# Patient Record
Sex: MALE | Race: BLACK OR AFRICAN AMERICAN | Employment: OTHER | URBAN - METROPOLITAN AREA
[De-identification: names, ages, dates, MRNs, and addresses within clinical notes are randomized per-mention and may not be internally consistent; named-entity substitution may affect disease eponyms.]

---

## 2020-10-14 ENCOUNTER — TRANSCRIBE ORDER (OUTPATIENT)
Dept: REGISTRATION | Age: 75
End: 2020-10-14

## 2020-10-14 ENCOUNTER — HOSPITAL ENCOUNTER (OUTPATIENT)
Dept: PREADMISSION TESTING | Age: 75
Discharge: HOME | End: 2020-10-14
Payer: MEDICARE

## 2020-10-14 DIAGNOSIS — N13.8 ENLARGED PROSTATE WITH URINARY OBSTRUCTION: Primary | ICD-10-CM

## 2020-10-14 DIAGNOSIS — N40.1 ENLARGED PROSTATE WITH URINARY OBSTRUCTION: Primary | ICD-10-CM

## 2020-10-14 DIAGNOSIS — N40.1 ENLARGED PROSTATE WITH URINARY OBSTRUCTION: ICD-10-CM

## 2020-10-14 DIAGNOSIS — N13.8 ENLARGED PROSTATE WITH URINARY OBSTRUCTION: ICD-10-CM

## 2020-10-14 LAB
ANION GAP SERPL CALC-SCNC: 5 MMOL/L (ref 3–18)
ATRIAL RATE: 78 BPM
BASOPHILS # BLD: 0 K/UL (ref 0–0.1)
BASOPHILS NFR BLD: 0 % (ref 0–2)
BUN SERPL-MCNC: 25 MG/DL (ref 7–18)
BUN/CREAT SERPL: 11 (ref 12–20)
CALCIUM SERPL-MCNC: 8.6 MG/DL (ref 8.5–10.1)
CALCULATED P AXIS, ECG09: 76 DEGREES
CALCULATED R AXIS, ECG10: 28 DEGREES
CALCULATED T AXIS, ECG11: 16 DEGREES
CHLORIDE SERPL-SCNC: 108 MMOL/L (ref 100–111)
CO2 SERPL-SCNC: 28 MMOL/L (ref 21–32)
CREAT SERPL-MCNC: 2.3 MG/DL (ref 0.6–1.3)
DIAGNOSIS, 93000: NORMAL
DIFFERENTIAL METHOD BLD: ABNORMAL
EOSINOPHIL # BLD: 0.2 K/UL (ref 0–0.4)
EOSINOPHIL NFR BLD: 3 % (ref 0–5)
ERYTHROCYTE [DISTWIDTH] IN BLOOD BY AUTOMATED COUNT: 13.5 % (ref 11.6–14.5)
GLUCOSE SERPL-MCNC: 106 MG/DL (ref 74–99)
HCT VFR BLD AUTO: 34.3 % (ref 36–48)
HGB BLD-MCNC: 10.5 G/DL (ref 13–16)
LYMPHOCYTES # BLD: 2 K/UL (ref 0.9–3.6)
LYMPHOCYTES NFR BLD: 33 % (ref 21–52)
MCH RBC QN AUTO: 28.3 PG (ref 24–34)
MCHC RBC AUTO-ENTMCNC: 30.6 G/DL (ref 31–37)
MCV RBC AUTO: 92.5 FL (ref 74–97)
MONOCYTES # BLD: 0.6 K/UL (ref 0.05–1.2)
MONOCYTES NFR BLD: 10 % (ref 3–10)
NEUTS SEG # BLD: 3.3 K/UL (ref 1.8–8)
NEUTS SEG NFR BLD: 54 % (ref 40–73)
P-R INTERVAL, ECG05: 182 MS
PLATELET # BLD AUTO: 224 K/UL (ref 135–420)
PMV BLD AUTO: 10.2 FL (ref 9.2–11.8)
POTASSIUM SERPL-SCNC: 3.9 MMOL/L (ref 3.5–5.5)
Q-T INTERVAL, ECG07: 408 MS
QRS DURATION, ECG06: 86 MS
QTC CALCULATION (BEZET), ECG08: 465 MS
RBC # BLD AUTO: 3.71 M/UL (ref 4.7–5.5)
SODIUM SERPL-SCNC: 141 MMOL/L (ref 136–145)
VENTRICULAR RATE, ECG03: 78 BPM
WBC # BLD AUTO: 6 K/UL (ref 4.6–13.2)

## 2020-10-14 PROCEDURE — 85025 COMPLETE CBC W/AUTO DIFF WBC: CPT

## 2020-10-14 PROCEDURE — 36415 COLL VENOUS BLD VENIPUNCTURE: CPT

## 2020-10-14 PROCEDURE — 80048 BASIC METABOLIC PNL TOTAL CA: CPT

## 2020-10-14 PROCEDURE — 87635 SARS-COV-2 COVID-19 AMP PRB: CPT

## 2020-10-14 PROCEDURE — 93005 ELECTROCARDIOGRAM TRACING: CPT

## 2020-10-15 LAB — SARS-COV-2, COV2NT: NOT DETECTED

## 2020-10-19 ENCOUNTER — ANESTHESIA EVENT (OUTPATIENT)
Dept: SURGERY | Age: 75
End: 2020-10-19
Payer: MEDICARE

## 2020-10-20 ENCOUNTER — APPOINTMENT (OUTPATIENT)
Dept: GENERAL RADIOLOGY | Age: 75
End: 2020-10-20
Attending: UROLOGY
Payer: MEDICARE

## 2020-10-20 ENCOUNTER — ANESTHESIA (OUTPATIENT)
Dept: SURGERY | Age: 75
End: 2020-10-20
Payer: MEDICARE

## 2020-10-20 ENCOUNTER — HOSPITAL ENCOUNTER (OUTPATIENT)
Age: 75
Setting detail: OUTPATIENT SURGERY
Discharge: HOME | End: 2020-10-20
Attending: UROLOGY | Admitting: UROLOGY
Payer: MEDICARE

## 2020-10-20 VITALS
SYSTOLIC BLOOD PRESSURE: 149 MMHG | BODY MASS INDEX: 24.89 KG/M2 | HEART RATE: 79 BPM | OXYGEN SATURATION: 100 % | DIASTOLIC BLOOD PRESSURE: 65 MMHG | HEIGHT: 67 IN | RESPIRATION RATE: 12 BRPM | WEIGHT: 158.6 LBS | TEMPERATURE: 98.5 F

## 2020-10-20 DIAGNOSIS — N99.116 POSTPROCEDURAL STRICTURE OF OVERLAPPING SITES OF URETHRA IN MALE: Primary | ICD-10-CM

## 2020-10-20 PROBLEM — N35.919 STRICTURE OF URETHRA: Status: ACTIVE | Noted: 2020-10-20

## 2020-10-20 PROCEDURE — 2709999900 HC NON-CHARGEABLE SUPPLY: Performed by: UROLOGY

## 2020-10-20 PROCEDURE — 77030005518 HC CATH URETH FOL 2W BARD -B: Performed by: UROLOGY

## 2020-10-20 PROCEDURE — 76210000027 HC REC RM PH II 3.5 TO 4 HR: Performed by: UROLOGY

## 2020-10-20 PROCEDURE — 77030012508 HC MSK AIRWY LMA AMBU -A: Performed by: ANESTHESIOLOGY

## 2020-10-20 PROCEDURE — 76060000032 HC ANESTHESIA 0.5 TO 1 HR: Performed by: UROLOGY

## 2020-10-20 PROCEDURE — 74011000250 HC RX REV CODE- 250: Performed by: NURSE ANESTHETIST, CERTIFIED REGISTERED

## 2020-10-20 PROCEDURE — 76010000138 HC OR TIME 0.5 TO 1 HR: Performed by: UROLOGY

## 2020-10-20 PROCEDURE — 77030014023 HC SYR INFL LVEEN BSC -B: Performed by: UROLOGY

## 2020-10-20 PROCEDURE — 74011250636 HC RX REV CODE- 250/636: Performed by: UROLOGY

## 2020-10-20 PROCEDURE — 74011250636 HC RX REV CODE- 250/636: Performed by: NURSE ANESTHETIST, CERTIFIED REGISTERED

## 2020-10-20 PROCEDURE — 74011000636 HC RX REV CODE- 636: Performed by: UROLOGY

## 2020-10-20 PROCEDURE — 76210000063 HC OR PH I REC FIRST 0.5 HR: Performed by: UROLOGY

## 2020-10-20 PROCEDURE — 77010033678 HC OXYGEN DAILY

## 2020-10-20 PROCEDURE — 77030040361 HC SLV COMPR DVT MDII -B: Performed by: UROLOGY

## 2020-10-20 PROCEDURE — C1726 CATH, BAL DIL, NON-VASCULAR: HCPCS | Performed by: UROLOGY

## 2020-10-20 PROCEDURE — 77030020782 HC GWN BAIR PAWS FLX 3M -B: Performed by: UROLOGY

## 2020-10-20 RX ORDER — SODIUM CHLORIDE 0.9 % (FLUSH) 0.9 %
5-40 SYRINGE (ML) INJECTION EVERY 8 HOURS
Status: DISCONTINUED | OUTPATIENT
Start: 2020-10-20 | End: 2020-10-20 | Stop reason: HOSPADM

## 2020-10-20 RX ORDER — TRAMADOL HYDROCHLORIDE 50 MG/1
50 TABLET ORAL
Qty: 10 TAB | Refills: 0 | Status: SHIPPED | OUTPATIENT
Start: 2020-10-20 | End: 2020-10-23

## 2020-10-20 RX ORDER — LEVOFLOXACIN 5 MG/ML
500 INJECTION, SOLUTION INTRAVENOUS ONCE
Status: COMPLETED | OUTPATIENT
Start: 2020-10-20 | End: 2020-10-20

## 2020-10-20 RX ORDER — FLUMAZENIL 0.1 MG/ML
0.2 INJECTION INTRAVENOUS
Status: DISCONTINUED | OUTPATIENT
Start: 2020-10-20 | End: 2020-10-20 | Stop reason: HOSPADM

## 2020-10-20 RX ORDER — MAGNESIUM SULFATE 100 %
4 CRYSTALS MISCELLANEOUS AS NEEDED
Status: DISCONTINUED | OUTPATIENT
Start: 2020-10-20 | End: 2020-10-20 | Stop reason: HOSPADM

## 2020-10-20 RX ORDER — SODIUM CHLORIDE 0.9 % (FLUSH) 0.9 %
5-40 SYRINGE (ML) INJECTION AS NEEDED
Status: DISCONTINUED | OUTPATIENT
Start: 2020-10-20 | End: 2020-10-20 | Stop reason: HOSPADM

## 2020-10-20 RX ORDER — HYDROMORPHONE HYDROCHLORIDE 1 MG/ML
0.5 INJECTION, SOLUTION INTRAMUSCULAR; INTRAVENOUS; SUBCUTANEOUS
Status: DISCONTINUED | OUTPATIENT
Start: 2020-10-20 | End: 2020-10-20 | Stop reason: HOSPADM

## 2020-10-20 RX ORDER — SODIUM CHLORIDE, SODIUM LACTATE, POTASSIUM CHLORIDE, CALCIUM CHLORIDE 600; 310; 30; 20 MG/100ML; MG/100ML; MG/100ML; MG/100ML
125 INJECTION, SOLUTION INTRAVENOUS CONTINUOUS
Status: DISCONTINUED | OUTPATIENT
Start: 2020-10-20 | End: 2020-10-20 | Stop reason: HOSPADM

## 2020-10-20 RX ORDER — LEVOFLOXACIN 250 MG/1
250 TABLET ORAL DAILY
Qty: 4 TAB | Refills: 0 | Status: SHIPPED | OUTPATIENT
Start: 2020-10-21 | End: 2020-10-25

## 2020-10-20 RX ORDER — ONDANSETRON 2 MG/ML
INJECTION INTRAMUSCULAR; INTRAVENOUS AS NEEDED
Status: DISCONTINUED | OUTPATIENT
Start: 2020-10-20 | End: 2020-10-20 | Stop reason: HOSPADM

## 2020-10-20 RX ORDER — FENTANYL CITRATE 50 UG/ML
25 INJECTION, SOLUTION INTRAMUSCULAR; INTRAVENOUS AS NEEDED
Status: DISCONTINUED | OUTPATIENT
Start: 2020-10-20 | End: 2020-10-20 | Stop reason: HOSPADM

## 2020-10-20 RX ORDER — LIDOCAINE HYDROCHLORIDE 20 MG/ML
INJECTION, SOLUTION EPIDURAL; INFILTRATION; INTRACAUDAL; PERINEURAL AS NEEDED
Status: DISCONTINUED | OUTPATIENT
Start: 2020-10-20 | End: 2020-10-20 | Stop reason: HOSPADM

## 2020-10-20 RX ORDER — NALOXONE HYDROCHLORIDE 0.4 MG/ML
0.1 INJECTION, SOLUTION INTRAMUSCULAR; INTRAVENOUS; SUBCUTANEOUS AS NEEDED
Status: DISCONTINUED | OUTPATIENT
Start: 2020-10-20 | End: 2020-10-20 | Stop reason: HOSPADM

## 2020-10-20 RX ORDER — PROPOFOL 10 MG/ML
INJECTION, EMULSION INTRAVENOUS AS NEEDED
Status: DISCONTINUED | OUTPATIENT
Start: 2020-10-20 | End: 2020-10-20 | Stop reason: HOSPADM

## 2020-10-20 RX ORDER — HYDROMORPHONE HYDROCHLORIDE 1 MG/ML
INJECTION, SOLUTION INTRAMUSCULAR; INTRAVENOUS; SUBCUTANEOUS AS NEEDED
Status: DISCONTINUED | OUTPATIENT
Start: 2020-10-20 | End: 2020-10-20 | Stop reason: HOSPADM

## 2020-10-20 RX ORDER — SODIUM CHLORIDE, SODIUM LACTATE, POTASSIUM CHLORIDE, CALCIUM CHLORIDE 600; 310; 30; 20 MG/100ML; MG/100ML; MG/100ML; MG/100ML
1000 INJECTION, SOLUTION INTRAVENOUS CONTINUOUS
Status: DISCONTINUED | OUTPATIENT
Start: 2020-10-20 | End: 2020-10-20 | Stop reason: HOSPADM

## 2020-10-20 RX ADMIN — PROPOFOL 100 MG: 10 INJECTION, EMULSION INTRAVENOUS at 08:01

## 2020-10-20 RX ADMIN — HYDROMORPHONE HYDROCHLORIDE 0.2 MG: 1 INJECTION, SOLUTION INTRAMUSCULAR; INTRAVENOUS; SUBCUTANEOUS at 07:52

## 2020-10-20 RX ADMIN — LIDOCAINE HYDROCHLORIDE 60 MG: 20 INJECTION, SOLUTION EPIDURAL; INFILTRATION; INTRACAUDAL; PERINEURAL at 08:00

## 2020-10-20 RX ADMIN — PROPOFOL 40 MG: 10 INJECTION, EMULSION INTRAVENOUS at 08:03

## 2020-10-20 RX ADMIN — SODIUM CHLORIDE, SODIUM LACTATE, POTASSIUM CHLORIDE, AND CALCIUM CHLORIDE 125 ML/HR: 600; 310; 30; 20 INJECTION, SOLUTION INTRAVENOUS at 07:31

## 2020-10-20 RX ADMIN — ONDANSETRON HYDROCHLORIDE 4 MG: 2 INJECTION INTRAMUSCULAR; INTRAVENOUS at 08:26

## 2020-10-20 RX ADMIN — HYDROMORPHONE HYDROCHLORIDE 0.1 MG: 1 INJECTION, SOLUTION INTRAMUSCULAR; INTRAVENOUS; SUBCUTANEOUS at 08:02

## 2020-10-20 RX ADMIN — SODIUM CHLORIDE, SODIUM LACTATE, POTASSIUM CHLORIDE, AND CALCIUM CHLORIDE 125 ML/HR: 600; 310; 30; 20 INJECTION, SOLUTION INTRAVENOUS at 08:38

## 2020-10-20 RX ADMIN — LEVOFLOXACIN 500 MG: 5 INJECTION, SOLUTION INTRAVENOUS at 07:52

## 2020-10-20 NOTE — H&P
Urology [facility] [address] [address]  Tel: [phone]  Fax: [phone]      Patient: Nancy Vargas  YOB: 1945          Assessment/Plan  # Detail Type Description    1. Assessment Acute cystitis without hematuria (N30.00). Patient Plan He had a recent UTI. He's feeling much better. He will come by and drop off a UA in the days ahead to send for culture. 2. 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 Patient Plan He has a hx of a urethral stricture and had a dilation done 5 yrs ago by Dr. Aspen Davila. We discussed likely he has a recurrence. We will do a cysto and possible urethral dilation in the OR. Risk of bleeding, infection, recurrence and need for cath for 2 to 7 days was discussed. He will proceed. 3. Assessment Feeling of incomplete bladder emptying (R39.14). Patient Plan His PVR is 572ml 2hrs post void. We discussed this is likely risk for getting a UTI. 4. Assessment Enlarged prostate w/ LUTS (N40.1). Patient Plan He also has some prostate enlargement. We discussed if his urethral caliber was normal then I will do a bipolar TURP instead of a dilation. Risk of bleeding, infection, injury as well as stricture formation and need for future tx's was discussed. We also discussed there may be sexual side effects as well. 5. Assessment Poor urinary stream (R39.12). Patient Plan Now that his infection was tx'd he's actually not that bad. This 66year old male presents for UTI and BPH. History of Present Illness:  1.  UTI   The onset was 2 weeks ago. The severity of the problem is moderate. The problem is improving. The symptoms are recurring. Presenting/Initial symptoms include lower back pain. Pertinent history includes being over 50 and alcohol consumption. Pertinent negatives include constipation.  Additional information: He has a h/o urethral stricture in 2015 treated by Dr Shanda Landis. HE nahd an enterococcus UTI. He ahs a h/o UTI several years ago. 2.  BPH   Onset was gradual. Severity level is moderate. Associated symptoms include dysuria. Pertinent negatives include chills, constipation, fever, hematuria, pressure, slow stream, urgency, urinary incontinence and urinary straining. Additional information: He ahs a h/o strictrue. Giovana Sim PAST MEDICAL/SURGICAL HISTORY   (Detailed)    Disease/disorder Onset Date Management Date Comments   Hypertension       Renal disease             PROBLEM LIST:   Problem List reviewed. Medications (active prior to today)  Medication Name Sig Description Start Date Stop Date Refilled Rx Elsewhere   amlodipine 5 mg tablet take 1 tablet by oral route  every day //   Y   tamsulosin 0.4 mg capsule take 1 capsule by oral route  every day 1/2 hour following the same meal each day //   Y   simvastatin 10 mg tablet take 1 tablet by oral route  every day in the evening //   Y   multivitamin capsule take 1 capsule by oral route  every day //   Y   Vitamin D2 1,250 mcg (50,000 unit) capsule take 1 capsule by ORAL route  every week //   Y   tizanidine 4 mg capsule take 1 capsule by ORAL route  every 4 hours //   Y   colchicine 0.6 mg capsule take 1 capsule by oral route  every day //   Y   amoxicillin 250 mg capsule take 1 capsule by oral route  every 8 hours //   Y     Medication Reconciliation  Medications reconciled today. Allergies  Ingredient Reaction (Severity) Medication Name Comment   NO KNOWN ALLERGIES        Reviewed, no changes. Family History  (Detailed)  Relationship Family Member Name  Age at Death Condition Onset Age Cause of Death   Family h/o    Diabetes     Family h/o    Heart disease           Social History:  (Detailed)  Tobacco use reviewed. Preferred language is [place]. MARITAL STATUS/FAMILY/SOCIAL SUPPORT  Marital status: Single   Smoking status: Former smoker.     TOBACCO SCREENING:  Patient has used tobacco. Patient has not used tobacco in the last 30 days. Patient has not used smokeless tobacco in the last 30 days. SMOKING STATUS  Type Smoking Status Usage Per Day Years Used Pack Years Total Pack Years    Former smoker         ALCOHOL  There is a history of alcohol use. consumed rarely. CAFFEINE  The patient uses caffeine: coffee. Review of Systems  System Neg/Pos Details   Constitutional Negative Chills and Fever. ENMT Negative Ear infections and Sore throat. Eyes Negative Blurred vision, Double vision and Eye pain. Respiratory Negative Asthma, Chronic cough, Dyspnea and Wheezing. Cardio Negative Chest pain. GI Negative Constipation, Decreased appetite, Diarrhea, Nausea and Vomiting.  Positive Dysuria.  Negative Hematuria, Pressure, Slow stream, Urgency, Urinary incontinence and Urinary straining. Endocrine Negative Cold intolerance, Heat intolerance, Increased thirst and Weight loss. Neuro Negative Headache and Tremors. Psych Negative Anxiety and Depression. Integumentary Negative Itching skin and Rash. MS Negative Back pain and Joint pain. Hema/Lymph Negative Easy bleeding. Reproductive Negative Sexual dysfunction. Vital Signs   Height  Time ft in cm Last Measured Height Position   3:50 PM 5.0 7.00 170.18     Weight/BSA/BMI  Time lb oz kg Context BMI kg/m2 BSA m2   3:50 .00  70.760  24.43    Blood Pressure  Time BP mm/Hg Position Side Site Method Cuff Size   3:50 /70        Temperature/Pulse/Respiration  Time Temp F Temp C Temp Site Pulse/min Pattern Resp/ min   3:50 PM 97.30 36.28  87     Measured By  Time Measured by   3:50 PM Kajal Tomlinson     Physical Exam  Exam Findings Details   Constitutional Normal Well developed. Neck Exam Normal Inspection - Normal.   Respiratory Normal Inspection - Normal.   Abdomen Normal No abdominal tenderness.    Genitourinary Normal Penis - Normal. Urethral meatus - Normal. Scrotum - Normal. Epididymides - Normal. Lymph nodes - Normal. Testes - Normal. No CVA tenderness. No suprapubic tenderness. Extremity Normal No edema. Psychiatric Normal Orientation - Oriented to time, place, person & situation. Appropriate mood and affect. Procedures:      Uroflow:  Feeling of incomplete bladder emptying R39.14. Consent was obtained. The procedure and risks were explained in detail. Questions were encouraged and answered. Patient was prepped and draped in the usual fashion. Procedure:   Sonographic residual urine: 572mL. Time after void: 2 Hours. Comments:. Physician: Andres Randall MD. Date: 10/08/2020. Time: 3:52 PM.  Post procedure: Instructions: [place]         Medications (added, continued, or stopped today)  Start Date Medication Directions PRN Status PRN Reason Instruction Stop Date    amlodipine 5 mg tablet take 1 tablet by oral route  every day N       amoxicillin 250 mg capsule take 1 capsule by oral route  every 8 hours N       colchicine 0.6 mg capsule take 1 capsule by oral route  every day N       multivitamin capsule take 1 capsule by oral route  every day N       simvastatin 10 mg tablet take 1 tablet by oral route  every day in the evening N       tamsulosin 0.4 mg capsule take 1 capsule by oral route  every day 1/2 hour following the same meal each day N       tizanidine 4 mg capsule take 1 capsule by ORAL route  every 4 hours N       Vitamin D2 1,250 mcg (50,000 unit) capsule take 1 capsule by ORAL route  every week N      Active Patient Care Team Members    Name Contact Agency Type Support Role Relationship Active Date Inactive Date [address] [place]   Patient provider PCP      Isaac Whitney   Emergency Contact Other          Provider:      Kelly Lagos MD

## 2020-10-20 NOTE — ANESTHESIA POSTPROCEDURE EVALUATION
Procedure(s):  CYSTOSCOPY, URETHRAL DILATION WITH C-ARM. general    Anesthesia Post Evaluation        Comments: Post-Anesthesia Evaluation & Assessment    Patient hemodynamically stable    No untreated/active PONV    Post-operative hydration adequate. Adequate post-operative analgesia per PACU discharge criteria    Mental status & level of consciousness: alert and oriented x 3    Respiratory status: patent unassisted airway     No apparent anesthetic complications requiring additional post-anesthetic care    Patient has met all discharge requirements. Lul Humphries MD          INITIAL Post-op Vital signs:   Vitals Value Taken Time   /70 10/20/2020  8:50 AM   Temp 36.9 °C (98.5 °F) 10/20/2020  8:36 AM   Pulse 79 10/20/2020  8:53 AM   Resp 13 10/20/2020  8:53 AM   SpO2 100 % 10/20/2020  8:53 AM   Vitals shown include unvalidated device data.

## 2020-10-20 NOTE — PERIOP NOTES
Dr. Megan Bejarano notified of large clot at base of penis - order to irrigate - collins irrigated with 40ml , 40ml, 40 ml  of sterile  water - return of irrigant  - no clots - DR. Espinoza notified in OR 9 - will continue to monitor until Dr. Megan Bejarano able to come see pt

## 2020-10-20 NOTE — PERIOP NOTES
Reviewed PTA medication list with patient/caregiver and patient/caregiver denies any additional medications. Patient admits to having a responsible adult care for them at home for at least 24 hours after surgery. Patient encouraged to use gown warming system and informed that using said warming gown to regulate body temperature prior to a procedure has been shown to help reduce the risks of blood clots and infection. Patient's pharmacy of choice verified and documented in PTA medication section. Dual skin assessment & fall risk band verification completed with Shiv Holland RN.

## 2020-10-20 NOTE — ANESTHESIA PREPROCEDURE EVALUATION
Relevant Problems   No relevant active problems       Anesthetic History               Review of Systems / Medical History  Patient summary reviewed, nursing notes reviewed and pertinent labs reviewed    Pulmonary  Within defined limits                 Neuro/Psych   Within defined limits           Cardiovascular    Hypertension          Hyperlipidemia         GI/Hepatic/Renal         Renal disease: CRI       Endo/Other        Arthritis and anemia     Other Findings            Physical Exam    Airway  Mallampati: II  TM Distance: 4 - 6 cm  Neck ROM: normal range of motion   Mouth opening: Normal     Cardiovascular    Rhythm: regular  Rate: normal         Dental      Comments: Has two upper teeth that are possibly loose   Pulmonary  Breath sounds clear to auscultation               Abdominal  GI exam deferred       Other Findings            Anesthetic Plan    ASA: 3  Anesthesia type: general          Induction: Intravenous  Anesthetic plan and risks discussed with: Patient      Discussed with patient that the teeth could fall out

## 2020-10-20 NOTE — BRIEF OP NOTE
Brief Postoperative Note    Patient: Bhavna Sheriff  YOB: 1945  MRN: [id number]    Date of Procedure: 10/20/2020     Pre-Op Diagnosis: URETHRAL BULBOUS STRICTURE, BPH WITH OBSTRUCTION    Post-Op Diagnosis: POST-PROCEDURAL STRICTURE OF OVERLAPPING SITES OF URETHRA    Procedure(s):  CYSTOSCOPY, URETHRAL DILATION WITH C-ARM    Surgeon(s):  Kei Mayes MD    Surgical Assistant: None    Anesthesia: General     Estimated Blood Loss (mL): Minimal    Complications: None    Specimens: * No specimens in log *     Implants: * No implants in log *    Drains: 22 FR [place] TIP MORTENSEN    Findings: VERY TIGHT LONG STRICTURE INVOLVING URETHRAL BULB AND THE PROXIMAL ASPECT OF PENILE URETHRA. DILATED TO 30 FR.   BLADDER AND PROSTATE WERE FINE    Electronically Signed by Deven Linda MD on 10/20/2020 at 8:44 AM

## 2020-10-20 NOTE — DISCHARGE INSTRUCTIONS
Drink plenty of fluids to keep the urine clean. Resume pre-hospital diet. Ambulate in the house. Take prescriptions as directed. Keep collins leg bag emptied as instructed - do not allow to overfill. Dr. Charmaine Soto office will call with a follow up appointment. DISCHARGE SUMMARY from Nurse    PATIENT INSTRUCTIONS:    After general anesthesia or intravenous sedation, for 24 hours or while taking prescription Narcotics:  · Limit your activities  · Do not drive and operate hazardous machinery  · Do not make important personal or business decisions  · Do  not drink alcoholic beverages  · If you have not urinated within 8 hours after discharge, please contact your surgeon on call. Report the following to your surgeon:  · Excessive pain, swelling, redness or odor of or around the surgical area  · Temperature over 100.5  · Nausea and vomiting lasting longer than 4 hours or if unable to take medications  · Any signs of decreased circulation or nerve impairment to extremity: change in color, persistent  numbness, tingling, coldness or increase pain  · Any questions    What to do at Home:  Recommended activity: Ambulate in house. If you experience any of the following symptoms fever, chills, active bleeding, not producing urine in leg bag, persistent nausea and/or vomiting, please follow up with Dr. Gabe Garcia. *  Please give a list of your current medications to your Primary Care Provider. *  Please update this list whenever your medications are discontinued, doses are      changed, or new medications (including over-the-counter products) are added. *  Please carry medication information at all times in case of emergency situations. These are general instructions for a healthy lifestyle:    No smoking/ No tobacco products/ Avoid exposure to second hand smoke  Surgeon General's Warning:  Quitting smoking now greatly reduces serious risk to your health.     Obesity, smoking, and sedentary lifestyle greatly increases your risk for illness    A healthy diet, regular physical exercise & weight monitoring are important for maintaining a healthy lifestyle    You may be retaining fluid if you have a history of heart failure or if you experience any of the following symptoms:  Weight gain of 3 pounds or more overnight or 5 pounds in a week, increased swelling in our hands or feet or shortness of breath while lying flat in bed. Please call your doctor as soon as you notice any of these symptoms; do not wait until your next office visit. The discharge information has been reviewed with the patient and caregiver. The patient and caregiver verbalized understanding. Discharge medications reviewed with the patient and caregiver and appropriate educational materials and side effects teaching were provided. Learning About Coronavirus [phone])  Coronavirus [phone]): Overview  What is coronavirus (COVID-19)? The coronavirus disease (COVID-19) is caused by a virus. It is an illness that was first found in [place], [place], in December 2019. It has since spread worldwide. The virus can cause fever, cough, and trouble breathing. In severe cases, it can cause pneumonia and make it hard to breathe without help. It can cause death. Coronaviruses are a large group of viruses. They cause the common cold. They also cause more serious illnesses like Middle East respiratory syndrome (MERS) and severe acute respiratory syndrome (SARS). COVID-19 is caused by a novel coronavirus. That means it's a new type that has not been seen in people before. This virus spreads person-to-person through droplets from coughing and sneezing. It can also spread when you are close to someone who is infected. And it can spread when you touch something that has the virus on it, such as a doorknob or a tabletop. What can you do to protect yourself from coronavirus (COVID-19)?   The best way to protect yourself from getting sick is to:  · Avoid areas where there is an outbreak. · Avoid contact with people who may be infected. · Wash your hands often with soap or alcohol-based hand sanitizers. · Avoid crowds and try to stay at least 6 feet away from other people. · Wash your hands often, especially after you cough or sneeze. Use soap and water, and scrub for at least 20 seconds. If soap and water aren't available, use an alcohol-based hand . · Avoid touching your mouth, nose, and eyes. What can you do to avoid spreading the virus to others? To help avoid spreading the virus to others:  · Cover your mouth with a tissue when you cough or sneeze. Then throw the tissue in the trash. · Use a disinfectant to clean things that you touch often. · Stay home if you are sick or have been exposed to the virus. Don't go to school, work, or public areas. And don't use public transportation. · If you are sick:  ? Leave your home only if you need to get medical care. But call the doctor's office first so they know you're coming. And wear a face mask, if you have one.  ? If you have a face mask, wear it whenever you're around other people. It can help stop the spread of the virus when you cough or sneeze. ? Clean and disinfect your home every day. Use household  and disinfectant wipes or sprays. Take special care to clean things that you grab with your hands. These include doorknobs, remote controls, phones, and handles on your refrigerator and microwave. And don't forget countertops, tabletops, bathrooms, and computer keyboards. When to call for help  Call 911 anytime you think you may need emergency care. For example, call if:  · You have severe trouble breathing. (You can't talk at all.)  · You have constant chest pain or pressure. · You are severely dizzy or lightheaded. · You are confused or can't think clearly. · Your face and lips have a blue color. · You pass out (lose consciousness) or are very hard to wake up.   Call your doctor now if you develop symptoms such as:  · Shortness of breath. · Fever. · Cough. If you need to get care, call ahead to the doctor's office for instructions before you go. Make sure you wear a face mask, if you have one, to prevent exposing other people to the virus. Where can you get the latest information? The following health organizations are tracking and studying this virus. Their websites contain the most up-to-date information. Armin Katarzyna also learn what to do if you think you may have been exposed to the virus. · U.S. Centers for Disease Control and Prevention (CDC): The CDC provides updated news about the disease and travel advice. The website also tells you how to prevent the spread of infection. www.cdc.gov  · World Health Organization [facility]): WHO offers information about the virus outbreaks. WHO also has travel advice. www.who.int  Current as of: April 1, 2020               Content Version: 12.4  © 8099-2279 [employer]. Care instructions adapted under license by your healthcare professional. If you have questions about a medical condition or this instruction, always ask your healthcare professional. [address] any warranty or liability for your use of this information.     ___________________________________________________________________________________________________________________________________

## 2020-10-20 NOTE — PERIOP NOTES
Dr. Radha Cedillo at bedside - bleeding evaluated - gauze applied - ok for discharge - pt and sister re-instructed on collins care

## 2020-10-20 NOTE — PERIOP NOTES
Pt arrived to Phase 2 - alert and oriented - a large amount of urine noted on stretcher sheet during assessment ( catheter not completely closed from PACU ) 2 cm blood clot noted at base of penis - leg bag emptied - pt cleaned up with bath wipes - assisted to recliner

## 2020-10-20 NOTE — OP NOTES
[facility]  OPERATIVE REPORT    Name:  Miguel Craig  MR#:   [id number]  :  1945  ACCOUNT #:  [de-identified]  DATE OF SERVICE:  10/20/2020    PREOPERATIVE DIAGNOSES:  Urethral bulbar stricture, BPH with obstruction. POSTOPERATIVE DIAGNOSIS:  Postprocedural stricture of overlapping sites of urethra. PROCEDURE PERFORMED:  Cystoscopy, urethral dilation. SURGEON:  Velma Lau MD    ASSISTANT:  None. ANESTHESIA:  General.    COMPLICATIONS:  None. SPECIMENS REMOVED:  None. IMPLANTS:  None. DRAIN:  A 22-[place]-tip [place]. ESTIMATED BLOOD LOSS:  Minimal.    FINDINGS:  The patient had a very tight long stricture of the urethra involving the bulb and the proximal aspect of the penile urethra. It was dilated to 30-[place]ine[place]. The bladder and prostate were fine. INDICATIONS:  The patient is a gentleman with a history of TURP in the past.  He has also had a history of urethral stricture. He has had declining urinary symptoms and presents for evaluation. PROCEDURE:   Preoperatively, risks and benefits of the surgery were described to the patient. The risks include but were not limited to bleeding, infection, injury to the bladder, injury to the urethra, possible need for future procedures. The patient understood the risks and signed the informed consent. The patient was taken to the operating room and placed on the OR table in supine position. He was administered general anesthetic. He was administered intravenous antibiotics. He was placed in dorsal lithotomy position, prepped and draped in the usual sterile manner. A 22-[place]ine[place] cystoscope and sheath were then inserted transurethrally and atraumatically under direct vision using a 30-degree lens. When I passed the scope, the fossa navicularis was wide open and the distal aspect of the penile urethra was wide open. However, I got to the proximal aspect, the penile urethra became very tight stricture.   They were approximately 5-[place]. I passed a 0.035 sensor wire through this stricture and into the bladder as confirmed by fluoroscopy. The scope was removed. Over the wire, I passed a NephroMax balloon beyond the point of the stricture as confirmed by fluoroscopy. The stricture was inflated and allowed to remain inflated for 2 minutes. The balloon was then deflated and removed leaving the wire in place. I then passed my scope without difficulty. The full extent of the stricture was seen going from the urethral bulb all the way through the distal aspect of the penile urethra, approximately 3 cm in length. I then passed my scope through the prostatic urethra which was mostly patent. Once in the bladder, there was grade I bladder trabeculations. There were no stones, no tumors anywhere within the bladder. Bilateral ureteral orifices were in normal anatomic position. At this point, I removed my scope. Over the wire, I passed a 22-[place] Lyman-tip catheter. The wire was removed. The balloon was inflated. The patient was then taken out of lithotomy position, revived from anesthesia and taken to Recovery in stable condition.       MD MUNDO [email or web address]  D:  10/20/2020 8:59  T:  10/20/2020 11:17  JOB #:  [id number]

## 2020-10-20 NOTE — ADDENDUM NOTE
Addendum  created 10/20/20 2134 by Adan Callahan CRNA    Flowsheet accepted, Intraprocedure Flowsheets edited

## 2020-10-20 NOTE — PERIOP NOTES
TRANSFER - OUT REPORT:    Verbal report given to [facility], R(name) on Hoover Dakins  being transferred to phase 2(unit) for routine post - op       Report consisted of patients Situation, Background, Assessment and   Recommendations(SBAR). Information from the following report(s) SBAR, Kardex, OR Summary, Procedure Summary, Intake/Output and MAR was reviewed with the receiving nurse. Lines:   Peripheral IV 10/20/20 Left Hand (Active)   Site Assessment Clean, dry, & intact 10/20/20 0848   Phlebitis Assessment 0 10/20/20 0848   Infiltration Assessment 0 10/20/20 0848   Dressing Status Clean, dry, & intact 10/20/20 0848   Dressing Type Transparent;Tape 10/20/20 0848   Hub Color/Line Status Patent 10/20/20 0814   Action Taken Armboard 10/20/20 0814   Alcohol Cap Used No 10/20/20 0730        Opportunity for questions and clarification was provided.       Patient transported with:   Registered Nurse

## 2020-10-20 NOTE — PERIOP NOTES
Discharge instructions completed - opportunity for questions - instructed pt and family on collins care

## 2022-01-01 ENCOUNTER — HOME CARE VISIT (OUTPATIENT)
Dept: HOSPICE | Facility: HOSPICE | Age: 77
End: 2022-01-01
Payer: MEDICARE

## 2022-01-01 ENCOUNTER — APPOINTMENT (OUTPATIENT)
Dept: NON INVASIVE DIAGNOSTICS | Age: 77
DRG: 435 | End: 2022-01-01
Attending: FAMILY MEDICINE
Payer: MEDICARE

## 2022-01-01 ENCOUNTER — APPOINTMENT (OUTPATIENT)
Dept: CT IMAGING | Age: 77
DRG: 435 | End: 2022-01-01
Attending: FAMILY MEDICINE
Payer: MEDICARE

## 2022-01-01 ENCOUNTER — HOSPITAL ENCOUNTER (INPATIENT)
Age: 77
LOS: 4 days | Discharge: HOSPICE | DRG: 435 | End: 2022-05-28
Attending: EMERGENCY MEDICINE | Admitting: FAMILY MEDICINE
Payer: MEDICARE

## 2022-01-01 ENCOUNTER — APPOINTMENT (OUTPATIENT)
Dept: GENERAL RADIOLOGY | Age: 77
DRG: 435 | End: 2022-01-01
Attending: HOSPITALIST
Payer: MEDICARE

## 2022-01-01 ENCOUNTER — APPOINTMENT (OUTPATIENT)
Dept: GENERAL RADIOLOGY | Age: 77
DRG: 435 | End: 2022-01-01
Attending: PHYSICIAN ASSISTANT
Payer: MEDICARE

## 2022-01-01 ENCOUNTER — HOSPICE ADMISSION (OUTPATIENT)
Dept: HOSPICE | Facility: HOSPICE | Age: 77
End: 2022-01-01
Payer: MEDICARE

## 2022-01-01 ENCOUNTER — APPOINTMENT (OUTPATIENT)
Dept: GENERAL RADIOLOGY | Age: 77
End: 2022-01-01
Attending: EMERGENCY MEDICINE
Payer: MEDICARE

## 2022-01-01 ENCOUNTER — HOME CARE VISIT (OUTPATIENT)
Dept: SCHEDULING | Facility: HOME HEALTH | Age: 77
End: 2022-01-01
Payer: MEDICARE

## 2022-01-01 ENCOUNTER — HOSPITAL ENCOUNTER (EMERGENCY)
Age: 77
Discharge: HOME | End: 2022-05-19
Attending: EMERGENCY MEDICINE
Payer: MEDICARE

## 2022-01-01 ENCOUNTER — APPOINTMENT (OUTPATIENT)
Dept: CT IMAGING | Age: 77
DRG: 435 | End: 2022-01-01
Attending: HOSPITALIST
Payer: MEDICARE

## 2022-01-01 VITALS
DIASTOLIC BLOOD PRESSURE: 64 MMHG | OXYGEN SATURATION: 100 % | TEMPERATURE: 98.3 F | RESPIRATION RATE: 16 BRPM | BODY MASS INDEX: 17.74 KG/M2 | HEART RATE: 108 BPM | HEIGHT: 68 IN | WEIGHT: 117.06 LBS | SYSTOLIC BLOOD PRESSURE: 108 MMHG

## 2022-01-01 VITALS
OXYGEN SATURATION: 99 % | HEIGHT: 68 IN | DIASTOLIC BLOOD PRESSURE: 75 MMHG | SYSTOLIC BLOOD PRESSURE: 146 MMHG | HEART RATE: 91 BPM | TEMPERATURE: 97.7 F | BODY MASS INDEX: 17.28 KG/M2 | RESPIRATION RATE: 14 BRPM | WEIGHT: 114 LBS

## 2022-01-01 VITALS
TEMPERATURE: 97 F | DIASTOLIC BLOOD PRESSURE: 42 MMHG | HEART RATE: 92 BPM | SYSTOLIC BLOOD PRESSURE: 72 MMHG | RESPIRATION RATE: 28 BRPM

## 2022-01-01 VITALS
HEART RATE: 99 BPM | RESPIRATION RATE: 19 BRPM | DIASTOLIC BLOOD PRESSURE: 67 MMHG | SYSTOLIC BLOOD PRESSURE: 134 MMHG | TEMPERATURE: 97.4 F

## 2022-01-01 DIAGNOSIS — C78.7 CARCINOMA OF PANCREAS METASTATIC TO LIVER (HCC): ICD-10-CM

## 2022-01-01 DIAGNOSIS — C80.0 CARCINOMATOSIS (HCC): ICD-10-CM

## 2022-01-01 DIAGNOSIS — N18.32 STAGE 3B CHRONIC KIDNEY DISEASE (HCC): ICD-10-CM

## 2022-01-01 DIAGNOSIS — R55 RECURRENT SYNCOPE: Primary | ICD-10-CM

## 2022-01-01 DIAGNOSIS — D64.9 ANEMIA, UNSPECIFIED TYPE: Primary | ICD-10-CM

## 2022-01-01 DIAGNOSIS — N18.30 STAGE 3 CHRONIC KIDNEY DISEASE, UNSPECIFIED WHETHER STAGE 3A OR 3B CKD (HCC): ICD-10-CM

## 2022-01-01 DIAGNOSIS — C25.9 CARCINOMA OF PANCREAS METASTATIC TO LIVER (HCC): ICD-10-CM

## 2022-01-01 DIAGNOSIS — M54.50 CHRONIC BILATERAL LOW BACK PAIN WITHOUT SCIATICA: ICD-10-CM

## 2022-01-01 DIAGNOSIS — R55 SYNCOPE AND COLLAPSE: ICD-10-CM

## 2022-01-01 DIAGNOSIS — N30.00 ACUTE CYSTITIS WITHOUT HEMATURIA: ICD-10-CM

## 2022-01-01 DIAGNOSIS — G89.29 CHRONIC BILATERAL LOW BACK PAIN WITHOUT SCIATICA: ICD-10-CM

## 2022-01-01 DIAGNOSIS — R63.4 WEIGHT LOSS: ICD-10-CM

## 2022-01-01 LAB
ALBUMIN SERPL-MCNC: 2.8 G/DL (ref 3.4–5)
ALBUMIN/GLOB SERPL: 0.6 {RATIO} (ref 0.8–1.7)
ALP SERPL-CCNC: 570 U/L (ref 45–117)
ALT SERPL-CCNC: 87 U/L (ref 16–61)
ANION GAP SERPL CALC-SCNC: 10 MMOL/L (ref 3–18)
ANION GAP SERPL CALC-SCNC: 7 MMOL/L (ref 3–18)
ANION GAP SERPL CALC-SCNC: 8 MMOL/L (ref 3–18)
ANION GAP SERPL CALC-SCNC: 9 MMOL/L (ref 3–18)
APPEARANCE UR: CLEAR
AST SERPL-CCNC: 57 U/L (ref 10–38)
ATRIAL RATE: 105 BPM
BACTERIA URNS QL MICRO: ABNORMAL /HPF
BASOPHILS # BLD: 0 K/UL (ref 0–0.1)
BASOPHILS # BLD: 0 K/UL (ref 0–0.1)
BASOPHILS NFR BLD: 0 % (ref 0–2)
BASOPHILS NFR BLD: 0 % (ref 0–2)
BILIRUB SERPL-MCNC: 0.7 MG/DL (ref 0.2–1)
BILIRUB UR QL: NEGATIVE
BUN SERPL-MCNC: 47 MG/DL (ref 7–18)
BUN SERPL-MCNC: 47 MG/DL (ref 7–18)
BUN SERPL-MCNC: 50 MG/DL (ref 7–18)
BUN SERPL-MCNC: 55 MG/DL (ref 7–18)
BUN/CREAT SERPL: 20 (ref 12–20)
BUN/CREAT SERPL: 20 (ref 12–20)
BUN/CREAT SERPL: 21 (ref 12–20)
BUN/CREAT SERPL: 24 (ref 12–20)
CALCIUM SERPL-MCNC: 8.8 MG/DL (ref 8.5–10.1)
CALCIUM SERPL-MCNC: 9.1 MG/DL (ref 8.5–10.1)
CALCIUM SERPL-MCNC: 9.3 MG/DL (ref 8.5–10.1)
CALCIUM SERPL-MCNC: 9.6 MG/DL (ref 8.5–10.1)
CALCULATED P AXIS, ECG09: 80 DEGREES
CALCULATED R AXIS, ECG10: 9 DEGREES
CALCULATED T AXIS, ECG11: -23 DEGREES
CHLORIDE SERPL-SCNC: 106 MMOL/L (ref 100–111)
CHLORIDE SERPL-SCNC: 108 MMOL/L (ref 100–111)
CHLORIDE SERPL-SCNC: 109 MMOL/L (ref 100–111)
CHLORIDE SERPL-SCNC: 109 MMOL/L (ref 100–111)
CO2 SERPL-SCNC: 20 MMOL/L (ref 21–32)
CO2 SERPL-SCNC: 21 MMOL/L (ref 21–32)
CO2 SERPL-SCNC: 22 MMOL/L (ref 21–32)
CO2 SERPL-SCNC: 24 MMOL/L (ref 21–32)
COLOR UR: YELLOW
CREAT SERPL-MCNC: 2.27 MG/DL (ref 0.6–1.3)
CREAT SERPL-MCNC: 2.33 MG/DL (ref 0.6–1.3)
CREAT SERPL-MCNC: 2.39 MG/DL (ref 0.6–1.3)
CREAT SERPL-MCNC: 2.41 MG/DL (ref 0.6–1.3)
DIAGNOSIS, 93000: NORMAL
DIFFERENTIAL METHOD BLD: ABNORMAL
DIFFERENTIAL METHOD BLD: ABNORMAL
ECHO AO ROOT DIAM: 3.1 CM
ECHO AO ROOT INDEX: 1.88 CM/M2
ECHO AR MAX VEL PISA: 2.6 M/S
ECHO AV MEAN GRADIENT: 2 MMHG
ECHO AV MEAN VELOCITY: 0.7 M/S
ECHO AV PEAK GRADIENT: 4 MMHG
ECHO AV PEAK VELOCITY: 1 M/S
ECHO AV REGURGITANT PHT: 1218.5 MILLISECOND
ECHO AV VTI: 14.1 CM
ECHO EST RA PRESSURE: 3 MMHG
ECHO LA DIAMETER INDEX: 1.39 CM/M2
ECHO LA DIAMETER: 2.3 CM
ECHO LA TO AORTIC ROOT RATIO: 0.74
ECHO LV E' LATERAL VELOCITY: 7 CM/S
ECHO LV E' SEPTAL VELOCITY: 7 CM/S
ECHO LV EDV A2C: 53 ML
ECHO LV EDV A4C: 44 ML
ECHO LV EDV INDEX A4C: 27 ML/M2
ECHO LV EDV NDEX A2C: 32 ML/M2
ECHO LV EJECTION FRACTION A2C: 41 %
ECHO LV ESV A2C: 31 ML
ECHO LV ESV INDEX A2C: 19 ML/M2
ECHO LV FRACTIONAL SHORTENING: 34 % (ref 28–44)
ECHO LV INTERNAL DIMENSION DIASTOLE INDEX: 2.48 CM/M2
ECHO LV INTERNAL DIMENSION DIASTOLIC: 4.1 CM (ref 4.2–5.9)
ECHO LV INTERNAL DIMENSION SYSTOLIC INDEX: 1.64 CM/M2
ECHO LV INTERNAL DIMENSION SYSTOLIC: 2.7 CM
ECHO LV IVSD: 0.8 CM (ref 0.6–1)
ECHO LV MASS 2D: 114.1 G (ref 88–224)
ECHO LV MASS INDEX 2D: 69.2 G/M2 (ref 49–115)
ECHO LV POSTERIOR WALL DIASTOLIC: 1 CM (ref 0.6–1)
ECHO LV RELATIVE WALL THICKNESS RATIO: 0.49
ECHO LVOT AREA: 3.5 CM2
ECHO LVOT DIAM: 2.1 CM
ECHO MV A VELOCITY: 0.4 M/S
ECHO MV E VELOCITY: 0.76 M/S
ECHO MV E/A RATIO: 1.9
ECHO MV E/E' LATERAL: 10.86
ECHO MV E/E' RATIO (AVERAGED): 10.86
ECHO MV E/E' SEPTAL: 10.86
ECHO PULMONARY ARTERY SYSTOLIC PRESSURE (PASP): 36 MMHG
ECHO RIGHT VENTRICULAR SYSTOLIC PRESSURE (RVSP): 36 MMHG
ECHO RV INTERNAL DIMENSION: 1.7 CM
ECHO RV TAPSE: 1.2 CM (ref 1.7–?)
ECHO TV REGURGITANT MAX VELOCITY: 2.87 M/S
ECHO TV REGURGITANT PEAK GRADIENT: 33 MMHG
EOSINOPHIL # BLD: 0 K/UL (ref 0–0.4)
EOSINOPHIL # BLD: 0.1 K/UL (ref 0–0.4)
EOSINOPHIL NFR BLD: 0 % (ref 0–5)
EOSINOPHIL NFR BLD: 1 % (ref 0–5)
EPITH CASTS URNS QL MICRO: ABNORMAL /LPF (ref 0–5)
ERYTHROCYTE [DISTWIDTH] IN BLOOD BY AUTOMATED COUNT: 15.8 % (ref 11.6–14.5)
ERYTHROCYTE [DISTWIDTH] IN BLOOD BY AUTOMATED COUNT: 16 % (ref 11.6–14.5)
ETHANOL SERPL-MCNC: <3 MG/DL (ref 0–3)
FOLATE SERPL-MCNC: 10.2 NG/ML (ref 3.1–17.5)
GLOBULIN SER CALC-MCNC: 4.5 G/DL (ref 2–4)
GLUCOSE SERPL-MCNC: 112 MG/DL (ref 74–99)
GLUCOSE SERPL-MCNC: 115 MG/DL (ref 74–99)
GLUCOSE SERPL-MCNC: 140 MG/DL (ref 74–99)
GLUCOSE SERPL-MCNC: 98 MG/DL (ref 74–99)
GLUCOSE UR STRIP.AUTO-MCNC: NEGATIVE MG/DL
HCT VFR BLD AUTO: 32.7 % (ref 36–48)
HCT VFR BLD AUTO: 32.7 % (ref 36–48)
HGB BLD-MCNC: 10.2 G/DL (ref 13–16)
HGB BLD-MCNC: 10.3 G/DL (ref 13–16)
HGB UR QL STRIP: NEGATIVE
IMM GRANULOCYTES # BLD AUTO: 0 K/UL (ref 0–0.04)
IMM GRANULOCYTES # BLD AUTO: 0.1 K/UL (ref 0–0.04)
IMM GRANULOCYTES NFR BLD AUTO: 0 % (ref 0–0.5)
IMM GRANULOCYTES NFR BLD AUTO: 1 % (ref 0–0.5)
IRON SATN MFR SERPL: 25 % (ref 20–50)
IRON SERPL-MCNC: 35 UG/DL (ref 50–175)
KETONES UR QL STRIP.AUTO: NEGATIVE MG/DL
LEUKOCYTE ESTERASE UR QL STRIP.AUTO: ABNORMAL
LYMPHOCYTES # BLD: 1.7 K/UL (ref 0.9–3.6)
LYMPHOCYTES # BLD: 4 K/UL (ref 0.9–3.6)
LYMPHOCYTES NFR BLD: 20 % (ref 21–52)
LYMPHOCYTES NFR BLD: 33 % (ref 21–52)
MAGNESIUM SERPL-MCNC: 2.8 MG/DL (ref 1.6–2.6)
MCH RBC QN AUTO: 29.8 PG (ref 24–34)
MCH RBC QN AUTO: 29.8 PG (ref 24–34)
MCHC RBC AUTO-ENTMCNC: 31.2 G/DL (ref 31–37)
MCHC RBC AUTO-ENTMCNC: 31.5 G/DL (ref 31–37)
MCV RBC AUTO: 94.5 FL (ref 78–100)
MCV RBC AUTO: 95.6 FL (ref 78–100)
MONOCYTES # BLD: 0.6 K/UL (ref 0.05–1.2)
MONOCYTES # BLD: 0.8 K/UL (ref 0.05–1.2)
MONOCYTES NFR BLD: 6 % (ref 3–10)
MONOCYTES NFR BLD: 7 % (ref 3–10)
NEUTS SEG # BLD: 6 K/UL (ref 1.8–8)
NEUTS SEG # BLD: 7.3 K/UL (ref 1.8–8)
NEUTS SEG NFR BLD: 60 % (ref 40–73)
NEUTS SEG NFR BLD: 72 % (ref 40–73)
NITRITE UR QL STRIP.AUTO: NEGATIVE
NRBC # BLD: 0 K/UL (ref 0–0.01)
NRBC # BLD: 0 K/UL (ref 0–0.01)
NRBC BLD-RTO: 0 PER 100 WBC
NRBC BLD-RTO: 0 PER 100 WBC
P-R INTERVAL, ECG05: 162 MS
PH UR STRIP: 6 [PH] (ref 5–8)
PLATELET # BLD AUTO: 259 K/UL (ref 135–420)
PLATELET # BLD AUTO: 268 K/UL (ref 135–420)
PMV BLD AUTO: 10.9 FL (ref 9.2–11.8)
PMV BLD AUTO: 9.7 FL (ref 9.2–11.8)
POTASSIUM SERPL-SCNC: 4.3 MMOL/L (ref 3.5–5.5)
POTASSIUM SERPL-SCNC: 4.7 MMOL/L (ref 3.5–5.5)
POTASSIUM SERPL-SCNC: 5.1 MMOL/L (ref 3.5–5.5)
POTASSIUM SERPL-SCNC: 5.4 MMOL/L (ref 3.5–5.5)
PROT SERPL-MCNC: 7.3 G/DL (ref 6.4–8.2)
PROT UR STRIP-MCNC: 100 MG/DL
Q-T INTERVAL, ECG07: 360 MS
QRS DURATION, ECG06: 88 MS
QTC CALCULATION (BEZET), ECG08: 475 MS
RBC # BLD AUTO: 3.42 M/UL (ref 4.35–5.65)
RBC # BLD AUTO: 3.46 M/UL (ref 4.35–5.65)
RBC #/AREA URNS HPF: NEGATIVE /HPF (ref 0–5)
SODIUM SERPL-SCNC: 137 MMOL/L (ref 136–145)
SODIUM SERPL-SCNC: 138 MMOL/L (ref 136–145)
SODIUM SERPL-SCNC: 139 MMOL/L (ref 136–145)
SODIUM SERPL-SCNC: 139 MMOL/L (ref 136–145)
SP GR UR REFRACTOMETRY: 1.02 (ref 1–1.03)
TIBC SERPL-MCNC: 141 UG/DL (ref 250–450)
TROPONIN-HIGH SENSITIVITY: 22 NG/L (ref 0–78)
TROPONIN-HIGH SENSITIVITY: 22 NG/L (ref 0–78)
TROPONIN-HIGH SENSITIVITY: 28 NG/L (ref 0–78)
TSH SERPL DL<=0.05 MIU/L-ACNC: 1.89 UIU/ML (ref 0.36–3.74)
UROBILINOGEN UR QL STRIP.AUTO: 1 EU/DL (ref 0.2–1)
VENTRICULAR RATE, ECG03: 105 BPM
VIT B12 SERPL-MCNC: 1390 PG/ML (ref 211–911)
WBC # BLD AUTO: 12.1 K/UL (ref 4.6–13.2)
WBC # BLD AUTO: 8.4 K/UL (ref 4.6–13.2)
WBC URNS QL MICRO: ABNORMAL /HPF (ref 0–5)

## 2022-01-01 PROCEDURE — 74011250637 HC RX REV CODE- 250/637: Performed by: FAMILY MEDICINE

## 2022-01-01 PROCEDURE — 36415 COLL VENOUS BLD VENIPUNCTURE: CPT

## 2022-01-01 PROCEDURE — 97165 OT EVAL LOW COMPLEX 30 MIN: CPT

## 2022-01-01 PROCEDURE — 85025 COMPLETE CBC W/AUTO DIFF WBC: CPT

## 2022-01-01 PROCEDURE — 97530 THERAPEUTIC ACTIVITIES: CPT

## 2022-01-01 PROCEDURE — 99356 PR PROLONGED SVC I/P OR OBS SETTING 1ST HOUR: CPT | Performed by: NURSE PRACTITIONER

## 2022-01-01 PROCEDURE — 74011250636 HC RX REV CODE- 250/636: Performed by: HOSPITALIST

## 2022-01-01 PROCEDURE — 83735 ASSAY OF MAGNESIUM: CPT

## 2022-01-01 PROCEDURE — 74011250637 HC RX REV CODE- 250/637: Performed by: HOSPITALIST

## 2022-01-01 PROCEDURE — 74011250636 HC RX REV CODE- 250/636: Performed by: EMERGENCY MEDICINE

## 2022-01-01 PROCEDURE — 84443 ASSAY THYROID STIM HORMONE: CPT

## 2022-01-01 PROCEDURE — 80048 BASIC METABOLIC PNL TOTAL CA: CPT

## 2022-01-01 PROCEDURE — 74011000250 HC RX REV CODE- 250: Performed by: FAMILY MEDICINE

## 2022-01-01 PROCEDURE — 93306 TTE W/DOPPLER COMPLETE: CPT

## 2022-01-01 PROCEDURE — 74011000250 HC RX REV CODE- 250: Performed by: HOSPITALIST

## 2022-01-01 PROCEDURE — 84484 ASSAY OF TROPONIN QUANT: CPT

## 2022-01-01 PROCEDURE — 72110 X-RAY EXAM L-2 SPINE 4/>VWS: CPT

## 2022-01-01 PROCEDURE — 0651 HSPC ROUTINE HOME CARE

## 2022-01-01 PROCEDURE — 74011250636 HC RX REV CODE- 250/636: Performed by: FAMILY MEDICINE

## 2022-01-01 PROCEDURE — 65270000046 HC RM TELEMETRY

## 2022-01-01 PROCEDURE — 71045 X-RAY EXAM CHEST 1 VIEW: CPT

## 2022-01-01 PROCEDURE — 81001 URINALYSIS AUTO W/SCOPE: CPT

## 2022-01-01 PROCEDURE — 3331090004 HSPC SERVICE INTENSITY ADD-ON

## 2022-01-01 PROCEDURE — 83540 ASSAY OF IRON: CPT

## 2022-01-01 PROCEDURE — G0299 HHS/HOSPICE OF RN EA 15 MIN: HCPCS

## 2022-01-01 PROCEDURE — 99284 EMERGENCY DEPT VISIT MOD MDM: CPT

## 2022-01-01 PROCEDURE — 74018 RADEX ABDOMEN 1 VIEW: CPT

## 2022-01-01 PROCEDURE — 80053 COMPREHEN METABOLIC PANEL: CPT

## 2022-01-01 PROCEDURE — 96374 THER/PROPH/DIAG INJ IV PUSH: CPT

## 2022-01-01 PROCEDURE — 70450 CT HEAD/BRAIN W/O DYE: CPT

## 2022-01-01 PROCEDURE — 74011250636 HC RX REV CODE- 250/636: Performed by: PHYSICIAN ASSISTANT

## 2022-01-01 PROCEDURE — 74176 CT ABD & PELVIS W/O CONTRAST: CPT

## 2022-01-01 PROCEDURE — 97162 PT EVAL MOD COMPLEX 30 MIN: CPT

## 2022-01-01 PROCEDURE — 82077 ASSAY SPEC XCP UR&BREATH IA: CPT

## 2022-01-01 PROCEDURE — 99221 1ST HOSP IP/OBS SF/LOW 40: CPT | Performed by: NURSE PRACTITIONER

## 2022-01-01 PROCEDURE — 99233 SBSQ HOSP IP/OBS HIGH 50: CPT | Performed by: NURSE PRACTITIONER

## 2022-01-01 PROCEDURE — 99285 EMERGENCY DEPT VISIT HI MDM: CPT

## 2022-01-01 PROCEDURE — 82607 VITAMIN B-12: CPT

## 2022-01-01 PROCEDURE — HOSPICE MEDICATION HC HH HOSPICE MEDICATION

## 2022-01-01 PROCEDURE — 3336500001 HSPC ELECTION

## 2022-01-01 PROCEDURE — 93005 ELECTROCARDIOGRAM TRACING: CPT

## 2022-01-01 RX ORDER — ACETAMINOPHEN 325 MG/1
650 TABLET ORAL
Status: DISCONTINUED | OUTPATIENT
Start: 2022-01-01 | End: 2022-01-01 | Stop reason: HOSPADM

## 2022-01-01 RX ORDER — CEPHALEXIN 500 MG/1
500 CAPSULE ORAL 2 TIMES DAILY
Qty: 14 CAPSULE | Refills: 0 | Status: SHIPPED | OUTPATIENT
Start: 2022-01-01 | End: 2022-01-01

## 2022-01-01 RX ORDER — MORPHINE SULFATE 20 MG/5ML
2.5 SOLUTION ORAL
Qty: 100 ML | Refills: 0 | Status: SHIPPED | OUTPATIENT
Start: 2022-01-01 | End: 2022-01-01

## 2022-01-01 RX ORDER — MELATONIN
1000 DAILY
Status: DISCONTINUED | OUTPATIENT
Start: 2022-01-01 | End: 2022-01-01

## 2022-01-01 RX ORDER — BISACODYL 5 MG
10 TABLET, DELAYED RELEASE (ENTERIC COATED) ORAL DAILY PRN
Status: DISCONTINUED | OUTPATIENT
Start: 2022-01-01 | End: 2022-01-01 | Stop reason: HOSPADM

## 2022-01-01 RX ORDER — SODIUM CHLORIDE 0.9 % (FLUSH) 0.9 %
5-40 SYRINGE (ML) INJECTION AS NEEDED
Status: DISCONTINUED | OUTPATIENT
Start: 2022-01-01 | End: 2022-01-01 | Stop reason: HOSPADM

## 2022-01-01 RX ORDER — HEPARIN SODIUM 5000 [USP'U]/ML
5000 INJECTION, SOLUTION INTRAVENOUS; SUBCUTANEOUS EVERY 8 HOURS
Status: DISCONTINUED | OUTPATIENT
Start: 2022-01-01 | End: 2022-01-01 | Stop reason: HOSPADM

## 2022-01-01 RX ORDER — FAMOTIDINE 20 MG/1
20 TABLET, FILM COATED ORAL 2 TIMES DAILY
Status: DISCONTINUED | OUTPATIENT
Start: 2022-01-01 | End: 2022-01-01

## 2022-01-01 RX ORDER — OXYCODONE HYDROCHLORIDE 5 MG/1
5 TABLET ORAL
Status: DISCONTINUED | OUTPATIENT
Start: 2022-01-01 | End: 2022-01-01 | Stop reason: HOSPADM

## 2022-01-01 RX ORDER — FAMOTIDINE 20 MG/1
20 TABLET, FILM COATED ORAL DAILY
Status: DISCONTINUED | OUTPATIENT
Start: 2022-01-01 | End: 2022-01-01

## 2022-01-01 RX ORDER — DEXTROSE MONOHYDRATE AND SODIUM CHLORIDE 5; .45 G/100ML; G/100ML
25 INJECTION, SOLUTION INTRAVENOUS CONTINUOUS
Status: DISCONTINUED | OUTPATIENT
Start: 2022-01-01 | End: 2022-01-01

## 2022-01-01 RX ORDER — LEVOFLOXACIN 500 MG/1
500 TABLET, FILM COATED ORAL DAILY
Qty: 5 TABLET | Refills: 0 | Status: SHIPPED | OUTPATIENT
Start: 2022-01-01 | End: 2022-01-01

## 2022-01-01 RX ORDER — HYOSCYAMINE SULFATE 0.125 MG
125 TABLET ORAL
Qty: 10 TABLET | Refills: 0 | Status: SHIPPED | OUTPATIENT
Start: 2022-01-01

## 2022-01-01 RX ORDER — MORPHINE SULFATE ORAL SOLUTION 10 MG/5ML
1 SOLUTION ORAL
Qty: 100 ML | Refills: 0 | Status: SHIPPED | OUTPATIENT
Start: 2022-01-01 | End: 2022-01-01

## 2022-01-01 RX ORDER — MORPHINE SULFATE 4 MG/ML
4 INJECTION INTRAVENOUS ONCE
Status: COMPLETED | OUTPATIENT
Start: 2022-01-01 | End: 2022-01-01

## 2022-01-01 RX ORDER — NALOXONE HYDROCHLORIDE 0.4 MG/ML
0.4 INJECTION, SOLUTION INTRAMUSCULAR; INTRAVENOUS; SUBCUTANEOUS AS NEEDED
Status: DISCONTINUED | OUTPATIENT
Start: 2022-01-01 | End: 2022-01-01 | Stop reason: HOSPADM

## 2022-01-01 RX ORDER — METHOCARBAMOL 750 MG/1
750 TABLET, FILM COATED ORAL 4 TIMES DAILY
Qty: 24 TABLET | Refills: 0 | Status: SHIPPED | OUTPATIENT
Start: 2022-01-01 | End: 2022-01-01

## 2022-01-01 RX ORDER — ONDANSETRON 2 MG/ML
4 INJECTION INTRAMUSCULAR; INTRAVENOUS
Status: DISCONTINUED | OUTPATIENT
Start: 2022-01-01 | End: 2022-01-01 | Stop reason: HOSPADM

## 2022-01-01 RX ORDER — FACIAL-BODY WIPES
10 EACH TOPICAL
Qty: 5 SUPPOSITORY | Refills: 0 | Status: SHIPPED | OUTPATIENT
Start: 2022-01-01

## 2022-01-01 RX ORDER — OMEGA-3-ACID ETHYL ESTERS 1 G/1
2 CAPSULE, LIQUID FILLED ORAL
Status: DISCONTINUED | OUTPATIENT
Start: 2022-01-01 | End: 2022-01-01

## 2022-01-01 RX ORDER — TAMSULOSIN HYDROCHLORIDE 0.4 MG/1
0.4 CAPSULE ORAL
Status: DISCONTINUED | OUTPATIENT
Start: 2022-01-01 | End: 2022-01-01 | Stop reason: HOSPADM

## 2022-01-01 RX ORDER — LORAZEPAM 2 MG/ML
1 CONCENTRATE ORAL
Qty: 30 ML | Refills: 0 | Status: SHIPPED | OUTPATIENT
Start: 2022-01-01

## 2022-01-01 RX ORDER — LORAZEPAM 2 MG/ML
1 CONCENTRATE ORAL
Qty: 30 ML | Refills: 0 | Status: SHIPPED | OUTPATIENT
Start: 2022-01-01 | End: 2022-01-01

## 2022-01-01 RX ORDER — SODIUM CHLORIDE 0.9 % (FLUSH) 0.9 %
5-40 SYRINGE (ML) INJECTION EVERY 8 HOURS
Status: DISCONTINUED | OUTPATIENT
Start: 2022-01-01 | End: 2022-01-01 | Stop reason: HOSPADM

## 2022-01-01 RX ORDER — ACETAMINOPHEN 650 MG/1
650 SUPPOSITORY RECTAL
Qty: 4 SUPPOSITORY | Refills: 0 | Status: SHIPPED | OUTPATIENT
Start: 2022-01-01

## 2022-01-01 RX ADMIN — SODIUM CHLORIDE, PRESERVATIVE FREE 20 MG: 5 INJECTION INTRAVENOUS at 22:51

## 2022-01-01 RX ADMIN — OMEGA-3-ACID ETHYL ESTERS 2000 MG: 1 CAPSULE, LIQUID FILLED ORAL at 10:23

## 2022-01-01 RX ADMIN — TAMSULOSIN HYDROCHLORIDE 0.4 MG: 0.4 CAPSULE ORAL at 18:04

## 2022-01-01 RX ADMIN — SODIUM CHLORIDE, PRESERVATIVE FREE 10 ML: 5 INJECTION INTRAVENOUS at 15:08

## 2022-01-01 RX ADMIN — HEPARIN SODIUM 5000 UNITS: 5000 INJECTION INTRAVENOUS; SUBCUTANEOUS at 16:36

## 2022-01-01 RX ADMIN — HEPARIN SODIUM 5000 UNITS: 5000 INJECTION INTRAVENOUS; SUBCUTANEOUS at 23:58

## 2022-01-01 RX ADMIN — OXYCODONE 5 MG: 5 TABLET ORAL at 09:11

## 2022-01-01 RX ADMIN — OXYCODONE 5 MG: 5 TABLET ORAL at 04:54

## 2022-01-01 RX ADMIN — OXYCODONE 5 MG: 5 TABLET ORAL at 16:52

## 2022-01-01 RX ADMIN — HEPARIN SODIUM 5000 UNITS: 5000 INJECTION INTRAVENOUS; SUBCUTANEOUS at 16:52

## 2022-01-01 RX ADMIN — TAMSULOSIN HYDROCHLORIDE 0.4 MG: 0.4 CAPSULE ORAL at 16:52

## 2022-01-01 RX ADMIN — OXYCODONE 5 MG: 5 TABLET ORAL at 15:08

## 2022-01-01 RX ADMIN — OXYCODONE 5 MG: 5 TABLET ORAL at 15:38

## 2022-01-01 RX ADMIN — SODIUM CHLORIDE, PRESERVATIVE FREE 10 ML: 5 INJECTION INTRAVENOUS at 21:15

## 2022-01-01 RX ADMIN — Medication 1000 UNITS: at 10:23

## 2022-01-01 RX ADMIN — SODIUM CHLORIDE, PRESERVATIVE FREE 10 ML: 5 INJECTION INTRAVENOUS at 06:51

## 2022-01-01 RX ADMIN — SODIUM CHLORIDE, PRESERVATIVE FREE 10 ML: 5 INJECTION INTRAVENOUS at 22:51

## 2022-01-01 RX ADMIN — HEPARIN SODIUM 5000 UNITS: 5000 INJECTION INTRAVENOUS; SUBCUTANEOUS at 00:36

## 2022-01-01 RX ADMIN — HEPARIN SODIUM 5000 UNITS: 5000 INJECTION INTRAVENOUS; SUBCUTANEOUS at 08:02

## 2022-01-01 RX ADMIN — SODIUM CHLORIDE, PRESERVATIVE FREE 20 MG: 5 INJECTION INTRAVENOUS at 08:45

## 2022-01-01 RX ADMIN — OXYCODONE 5 MG: 5 TABLET ORAL at 09:36

## 2022-01-01 RX ADMIN — OXYCODONE 5 MG: 5 TABLET ORAL at 23:33

## 2022-01-01 RX ADMIN — MORPHINE SULFATE 4 MG: 4 INJECTION INTRAVENOUS at 14:23

## 2022-01-01 RX ADMIN — SODIUM CHLORIDE, PRESERVATIVE FREE 10 ML: 5 INJECTION INTRAVENOUS at 06:06

## 2022-01-01 RX ADMIN — SODIUM CHLORIDE, PRESERVATIVE FREE 10 ML: 5 INJECTION INTRAVENOUS at 16:45

## 2022-01-01 RX ADMIN — HEPARIN SODIUM 5000 UNITS: 5000 INJECTION INTRAVENOUS; SUBCUTANEOUS at 01:00

## 2022-01-01 RX ADMIN — HEPARIN SODIUM 5000 UNITS: 5000 INJECTION INTRAVENOUS; SUBCUTANEOUS at 08:45

## 2022-01-01 RX ADMIN — SODIUM CHLORIDE 250 ML: 9 INJECTION, SOLUTION INTRAVENOUS at 11:07

## 2022-01-01 RX ADMIN — SODIUM CHLORIDE, PRESERVATIVE FREE 20 MG: 5 INJECTION INTRAVENOUS at 09:11

## 2022-01-01 RX ADMIN — TAMSULOSIN HYDROCHLORIDE 0.4 MG: 0.4 CAPSULE ORAL at 18:53

## 2022-01-01 RX ADMIN — HEPARIN SODIUM 5000 UNITS: 5000 INJECTION INTRAVENOUS; SUBCUTANEOUS at 16:45

## 2022-01-01 RX ADMIN — SODIUM CHLORIDE, PRESERVATIVE FREE 20 MG: 5 INJECTION INTRAVENOUS at 21:14

## 2022-01-01 RX ADMIN — SODIUM CHLORIDE, PRESERVATIVE FREE 10 ML: 5 INJECTION INTRAVENOUS at 22:35

## 2022-01-01 RX ADMIN — DEXTROSE MONOHYDRATE AND SODIUM CHLORIDE 50 ML/HR: 5; .45 INJECTION, SOLUTION INTRAVENOUS at 16:20

## 2022-01-01 RX ADMIN — FAMOTIDINE 20 MG: 20 TABLET, FILM COATED ORAL at 11:53

## 2022-01-01 RX ADMIN — OXYCODONE 5 MG: 5 TABLET ORAL at 05:35

## 2022-01-01 RX ADMIN — TAMSULOSIN HYDROCHLORIDE 0.4 MG: 0.4 CAPSULE ORAL at 16:36

## 2022-01-01 RX ADMIN — OXYCODONE 5 MG: 5 TABLET ORAL at 18:53

## 2022-01-01 RX ADMIN — SODIUM CHLORIDE, PRESERVATIVE FREE 10 ML: 5 INJECTION INTRAVENOUS at 21:31

## 2022-01-01 RX ADMIN — SODIUM CHLORIDE 1000 ML: 900 INJECTION, SOLUTION INTRAVENOUS at 13:31

## 2022-01-01 RX ADMIN — SODIUM CHLORIDE, PRESERVATIVE FREE 20 MG: 5 INJECTION INTRAVENOUS at 09:19

## 2022-01-01 RX ADMIN — SODIUM CHLORIDE, PRESERVATIVE FREE 10 ML: 5 INJECTION INTRAVENOUS at 14:00

## 2022-01-01 RX ADMIN — SODIUM CHLORIDE, PRESERVATIVE FREE 20 MG: 5 INJECTION INTRAVENOUS at 21:29

## 2022-05-19 NOTE — ED TRIAGE NOTES
Pt arrived via ems with c/o middle back pain that started last night but has gotten worse this AM. Per ems pt was ambulatory on scene. Pt verbalized he took pain meds this AM for his back but is unable to remember the medication name.

## 2022-05-19 NOTE — ED NOTES
I have reviewed discharge instructions with the patient, caregiver and guardian. The patient, caregiver and guardian verbalized understanding.

## 2022-05-19 NOTE — DISCHARGE INSTRUCTIONS
Return to the ED for worsening symptoms or further concerns. Select a geriatric specialist as your primary care doctor for further work-up. We recommend you start with your insurance carrier to see who they would recommend.

## 2022-05-19 NOTE — ED PROVIDER NOTES
EMERGENCY DEPARTMENT HISTORY AND PHYSICAL EXAM    Date: 5/19/2022  Patient Name: Jason Donis    History of Presenting Illness     Chief Complaint   Patient presents with    Back Pain       History Provided By: Patient, EMS and Family member     History Gutierrez Yeh):   1:03 PM  Jason Donis is a 64 y.o. male with a PMHX of hypertension, CKD 3 who presents to the emergency department by EMS C/O low back pain onset chronically but worse over the last day. Associated sxs include 10 pound weight loss over the last month. Pt denies  any other sxs or complaints. Patient's family member states that he has lost about 10 pounds over the last 2 weeks. She is giving him boost daily. He does seem to have decreased activity and decrease in his p.o. intake. Chief Complaint: Low back pain  Onset: Chronically but worse over the last day  Timing:  Acute on Chronic  Context: Symptoms started spontaneously, symptoms have progressively worsened since onset  Location: Bilateral low back  Quality: Sharp  Severity: Moderate  Modifying Factors: Nothing makes it better, or worse. Associated Symptoms: Weight loss, decreased activity, decreased oral intake    PCP: Leavy Severs, NP     Past History         Past Medical History:  Past Medical History:   Diagnosis Date    Chronic kidney disease     stage 3 in 2011    CKD (chronic kidney disease)     ED (erectile dysfunction)     Gout     HTN (hypertension)     Mixed hyperlipidemia        Past Surgical History:  Past Surgical History:   Procedure Laterality Date    HX COLONOSCOPY      HX ENDOSCOPY         Family History:  No family history on file.   Reviewed and non-contributory    Social History:  Social History     Tobacco Use    Smoking status: Former Smoker    Smokeless tobacco: Never Used   Substance Use Topics    Alcohol use: No     Alcohol/week: 0.0 standard drinks    Drug use: No       Medications:  Current Outpatient Medications   Medication Sig Dispense Refill    cephALEXin (Keflex) 500 mg capsule Take 1 Capsule by mouth two (2) times a day for 7 days. 14 Capsule 0    methocarbamoL (ROBAXIN) 750 mg tablet Take 1 Tablet by mouth four (4) times daily. 24 Tablet 0    amLODIPine (NORVASC) 5 mg tablet Take 5 mg by mouth daily.  tiZANidine (ZANAFLEX) 4 mg tablet Take 4 mg by mouth every six (6) hours as needed for Muscle Spasm(s).  MULTIVITAMINS WITH FLUORIDE (MULTI-VITAMIN PO) Take  by mouth.  Cholecalciferol, Vitamin D3, (VITAMIN D3) 1,000 unit cap Take  by mouth.  vardenafil (LEVITRA) 10 mg tablet Take 10 mg by mouth as needed.  omega-3 acid ethyl esters (LOVAZA) 1 gram capsule Take 2 g by mouth daily (with breakfast).  colchicine 0.6 mg tablet Take 0.6 mg by mouth daily as needed.  tamsulosin (FLOMAX) 0.4 mg capsule Take 0.4 mg by mouth Daily (before dinner).  simvastatin (ZOCOR) 10 mg tablet Take  by mouth nightly. Allergies:  No Known Allergies    Review of Systems      Review of Systems   Constitutional: Positive for activity change and appetite change. Negative for chills and fever. HENT: Negative for rhinorrhea and sore throat. Eyes: Negative for pain and visual disturbance. Respiratory: Negative for chest tightness, shortness of breath and wheezing. Cardiovascular: Negative for chest pain and palpitations. Gastrointestinal: Negative for abdominal pain, diarrhea, nausea and vomiting. Musculoskeletal: Positive for back pain. Negative for arthralgias and myalgias. Skin: Negative for rash and wound. Neurological: Negative for speech difficulty, light-headedness and headaches. Psychiatric/Behavioral: Negative for agitation and confusion. All other systems reviewed and are negative.       Physical Exam     Vitals:    05/19/22 1603 05/19/22 1613 05/19/22 1633 05/19/22 1712   BP: (!) 157/74 (!) 144/78 (!) 147/78 136/75   Pulse: 88 87 88 90   Resp: 16 18 21 22   Temp:       SpO2: 98% 99% 98% 99% Weight:       Height:           Physical Exam  Vitals and nursing note reviewed. Constitutional:       General: He is not in acute distress. Appearance: Normal appearance. He is underweight. He is not ill-appearing. HENT:      Head: Normocephalic and atraumatic. Nose: Nose normal. No rhinorrhea. Mouth/Throat:      Mouth: Mucous membranes are moist.      Pharynx: No oropharyngeal exudate or posterior oropharyngeal erythema. Eyes:      Extraocular Movements: Extraocular movements intact. Conjunctiva/sclera: Conjunctivae normal.      Pupils: Pupils are equal, round, and reactive to light. Cardiovascular:      Rate and Rhythm: Regular rhythm. Tachycardia present. Heart sounds: No murmur heard. No friction rub. No gallop. Pulmonary:      Effort: Pulmonary effort is normal. No respiratory distress. Breath sounds: Normal breath sounds. No wheezing, rhonchi or rales. Abdominal:      General: Bowel sounds are normal.      Palpations: Abdomen is soft. Tenderness: There is no abdominal tenderness. There is no guarding or rebound. Musculoskeletal:         General: No swelling or deformity. Normal range of motion. Cervical back: Normal range of motion and neck supple. No rigidity. Lumbar back: Tenderness present. No swelling, edema, deformity, signs of trauma, lacerations, spasms or bony tenderness. Normal range of motion. No scoliosis. Back:    Lymphadenopathy:      Cervical: No cervical adenopathy. Skin:     General: Skin is warm and dry. Findings: No rash. Neurological:      General: No focal deficit present. Mental Status: He is alert and oriented to person, place, and time.    Psychiatric:         Mood and Affect: Mood normal.         Behavior: Behavior normal.         Diagnostic Study Results     Labs -  Recent Results (from the past 12 hour(s))   CBC WITH AUTOMATED DIFF    Collection Time: 05/19/22  1:15 PM   Result Value Ref Range    WBC 8.4 4.6 - 13.2 K/uL    RBC 3.46 (L) 4.35 - 5.65 M/uL    HGB 10.3 (L) 13.0 - 16.0 g/dL    HCT 32.7 (L) 36.0 - 48.0 %    MCV 94.5 78.0 - 100.0 FL    MCH 29.8 24.0 - 34.0 PG    MCHC 31.5 31.0 - 37.0 g/dL    RDW 15.8 (H) 11.6 - 14.5 %    PLATELET 746 096 - 369 K/uL    MPV 9.7 9.2 - 11.8 FL    NRBC 0.0 0  WBC    ABSOLUTE NRBC 0.00 0.00 - 0.01 K/uL    NEUTROPHILS 72 40 - 73 %    LYMPHOCYTES 20 (L) 21 - 52 %    MONOCYTES 7 3 - 10 %    EOSINOPHILS 1 0 - 5 %    BASOPHILS 0 0 - 2 %    IMMATURE GRANULOCYTES 0 0.0 - 0.5 %    ABS. NEUTROPHILS 6.0 1.8 - 8.0 K/UL    ABS. LYMPHOCYTES 1.7 0.9 - 3.6 K/UL    ABS. MONOCYTES 0.6 0.05 - 1.2 K/UL    ABS. EOSINOPHILS 0.1 0.0 - 0.4 K/UL    ABS. BASOPHILS 0.0 0.0 - 0.1 K/UL    ABS. IMM.  GRANS. 0.0 0.00 - 0.04 K/UL    DF AUTOMATED     METABOLIC PANEL, BASIC    Collection Time: 05/19/22  1:15 PM   Result Value Ref Range    Sodium 137 136 - 145 mmol/L    Potassium 5.1 3.5 - 5.5 mmol/L    Chloride 106 100 - 111 mmol/L    CO2 24 21 - 32 mmol/L    Anion gap 7 3.0 - 18 mmol/L    Glucose 140 (H) 74 - 99 mg/dL    BUN 47 (H) 7.0 - 18 MG/DL    Creatinine 2.39 (H) 0.6 - 1.3 MG/DL    BUN/Creatinine ratio 20 12 - 20      GFR est AA 32 (L) >60 ml/min/1.73m2    GFR est non-AA 26 (L) >60 ml/min/1.73m2    Calcium 9.6 8.5 - 10.1 MG/DL   URINALYSIS W/ RFLX MICROSCOPIC    Collection Time: 05/19/22  4:30 PM   Result Value Ref Range    Color YELLOW      Appearance CLEAR      Specific gravity 1.018 1.005 - 1.030      pH (UA) 6.0 5.0 - 8.0      Protein 100 (A) NEG mg/dL    Glucose Negative NEG mg/dL    Ketone Negative NEG mg/dL    Bilirubin Negative NEG      Blood Negative NEG      Urobilinogen 1.0 0.2 - 1.0 EU/dL    Nitrites Negative NEG      Leukocyte Esterase TRACE (A) NEG     URINE MICROSCOPIC ONLY    Collection Time: 05/19/22  4:30 PM   Result Value Ref Range    WBC 4 to 6 0 - 5 /hpf    RBC Negative 0 - 5 /hpf    Epithelial cells 1+ 0 - 5 /lpf    Bacteria FEW (A) NEG /hpf       Radiologic Studies -   XR SPINE LUMB MIN 4 V   Final Result   1. Grade 1 anterolisthesis L4 and L5 with lower lumbar predominant degenerative   findings. No acute radiographic abnormality. CT Results  (Last 48 hours)    None        CXR Results  (Last 48 hours)    None          Medications given in the ED-  Medications   sodium chloride 0.9 % bolus infusion 1,000 mL (0 mL IntraVENous IV Completed 5/19/22 1643)   morphine injection 4 mg (4 mg IntraVENous Given 5/19/22 1423)       Procedures     Procedures    ED Course     I am the first provider for this patient. I reviewed the vital signs, available nursing notes, past medical history, past surgical history, family history and social history. Records Reviewed: Nursing Notes    Cardiac Monitor:  Rate: 113 bpm  Rhythm: tachycardic rhythm    Pulse Oximetry Analysis - 100% on RA    1:03 PM Initial assessment performed. The patients presenting problems have been discussed, and they are in agreement with the care plan formulated and outlined with them. I have encouraged them to ask questions as they arise throughout their visit. Medical Decision Making     Provider Notes (Medical Decision Making):   DDX: Sprain, strain, fracture, subluxation, anemia, CKD    Discussion:  64 y.o. male with acute on chronic low back pain, chronic kidney disease stage III, stable anemia. Patient also has signs of UTI on his urinalysis today. We will culture urine and treat as an outpatient. We will also adhere to his low back pain as an outpatient with mild muscle relaxers and pain relievers. Recommended patient follow-up with orthopedics. Did had a long discussion with the family on getting a geriatrician involved in his care to manage his consults and assist with maintaining a healthy weight and a healthy appetite. I recommended a couple of courses of actions the family for arranging for proper medical care. Patient family understand and agree with this plan.     Diagnosis and Disposition     DISCHARGE NOTE:  6:09 PM   Katharine Linder  results have been reviewed with him. He has been counseled regarding his diagnosis, treatment, and plan. He verbally conveys understanding and agreement of the signs, symptoms, diagnosis, treatment and prognosis and additionally agrees to follow up as discussed. He also agrees with the care-plan and conveys that all of his questions have been answered. I have also provided discharge instructions for him that include: educational information regarding their diagnosis and treatment, and list of reasons why they would want to return to the ED prior to their follow-up appointment, should his condition change. He has been provided with education for proper emergency department utilization. CLINICAL IMPRESSION:    1. Anemia, unspecified type    2. Stage 3b chronic kidney disease ([facility] [address]    3. Chronic bilateral low back pain without sciatica    4. Acute cystitis without hematuria        PLAN:  1. D/C Home  2. Current Discharge Medication List      START taking these medications    Details   cephALEXin (Keflex) 500 mg capsule Take 1 Capsule by mouth two (2) times a day for 7 days. Qty: 14 Capsule, Refills: 0  Start date: 5/19/2022, End date: 5/26/2022      methocarbamoL (ROBAXIN) 750 mg tablet Take 1 Tablet by mouth four (4) times daily. Qty: 24 Tablet, Refills: 0  Start date: 5/19/2022           3. Follow-up Information     Follow up With Specialties Details Why Contact Info Maxine Dance, NP Nurse Practitioner Schedule an appointment as soon as possible for a visit  As soon as possible, For follow up from Emergency Department visit. [address] [facility], [address],  Orthopedic Surgery Schedule an appointment as soon as possible for a visit  As needed; If symptoms worsen, For follow up from Emergency Department visit.  [address] [facility]      [facility] Emergency Medicine  As needed; If symptoms worsen [address] [place] [id number]  [address]     Please note that this dictation was completed with Fotofeedback, the computer voice recognition software. Quite often unanticipated grammatical, syntax, homophones, and other interpretive errors are inadvertently transcribed by the computer software. Please disregard these errors. Please excuse any errors that have escaped final proofreading.     Carmen Peoples MD

## 2022-05-24 PROBLEM — R55 RECURRENT SYNCOPE: Status: ACTIVE | Noted: 2022-01-01

## 2022-05-24 NOTE — ROUTINE PROCESS
Bedside and Verbal shift change report given to Paulina Weston RN (oncoming nurse) by Evgeny Ferreira RN (offgoing nurse). Report included the following information SBAR, Kardex, MAR, Recent Results and Cardiac Rhythm .

## 2022-05-24 NOTE — H&P
History & Physical    Patient: Gordon Mckeon MRN: [id number]  CSN: [id number]    YOB: 1945  Age: 64 y.o. Sex: male      DOA: 5/24/2022  Primary Care Provider:  Rolf Orozco NP      Assessment/Plan   Gordon Mckeon is a 64 y.o. male with PMHX of hypertension, chronic low back pain, CKD stage III who presents to the emergency department C/O syncopal episode just prior to arrival.    Admitted for syncope. Syncope  Cardiac monitoring  Cardiology consult, appreciate Dr. Perez Donaldson expertise  We will obtain CT head, not done in ED  Recurrent episode, had a similar episode 5/5/2022 and [facility]  Was diagnosed with UTI 5 days ago and given 5 days of Levaquin  Orthostatic type blood pressures  Blood glucose levels within normal limits  No new medications   EKG obtained  Lab work within normal limits  Echo   troponin wnl    Weight loss -  Nutrition consult  Nutrition supplementation     General debility and weakness -  Nutrition consult  PT/OT consults  Likely will need SNF placement     Hypertension -  Resume meds    CKD, Stage 3-  No acute issues     dvt and gi prophylaxis       Patient Active Problem List   Diagnosis Code    Microscopic hematuria R31.29    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 male with PMHX of hypertension, chronic low back pain, CKD stage III who presents to the emergency department C/O syncopal episode just prior to arrival.  States he passed out while getting out of the bathtub. Per EMS as reported by patient's son, the son was helping patient out of the bathtub when he had a syncopal episode. Son was holding onto him and lowered him to the ground there were no injuries.   Son told EMS that patient was \"out\" for 3 to 5 minutes but upon EMS arrival he was awake and alert and they noted a FSBS of 148. He is being evaluated OP for recent significant weight loss fatigue and poor appetite and drinks 2-3 ensure nutritional drinks per day,  He denies any prodromal symptoms prior to his syncopal episode, dizziness, chest pain, shortness of breath, palpitations, nausea, vomiting, headache, vision changes, extremity numbness or weakness and any other sxs or complaints.      Review of records shows that patient was seen at this facility 5 days ago for anemia stage III chronic kidney disease and acute on chronic low back pain. He was also seen at [facility] ER on 5/5/2022 for a syncopal episode and poor appetite. Past Medical History:   Diagnosis Date    Chronic kidney disease     stage 3 in 2011    CKD (chronic kidney disease)     ED (erectile dysfunction)     Gout     HTN (hypertension)     Mixed hyperlipidemia        Past Surgical History:   Procedure Laterality Date    HX COLONOSCOPY      HX ENDOSCOPY         No family history on file. Social History     Socioeconomic History    Marital status: SINGLE   Tobacco Use    Smoking status: Former Smoker    Smokeless tobacco: Never Used   Substance and Sexual Activity    Alcohol use: No     Alcohol/week: 0.0 standard drinks    Drug use: No       Prior to Admission medications    Medication Sig Start Date End Date Taking? Authorizing Provider   methocarbamoL (ROBAXIN) 750 mg tablet Take 1 Tablet by mouth four (4) times daily. 5/19/22   Monika Hashimoto, MD   levoFLOXacin (Levaquin) 500 mg tablet Take 1 Tablet by mouth daily. 5/19/22   Monika Hashimoto, MD   amLODIPine (NORVASC) 5 mg tablet Take 5 mg by mouth daily. Provider, Historical   tiZANidine (ZANAFLEX) 4 mg tablet Take 4 mg by mouth every six (6) hours as needed for Muscle Spasm(s). Provider, Historical   MULTIVITAMINS WITH FLUORIDE (MULTI-VITAMIN PO) Take  by mouth. Provider, Historical   Cholecalciferol, Vitamin D3, (VITAMIN D3) 1,000 unit cap Take  by mouth.     Provider, Historical   vardenafil (LEVITRA) 10 mg tablet Take 10 mg by mouth as needed. Provider, Historical   omega-3 acid ethyl esters (LOVAZA) 1 gram capsule Take 2 g by mouth daily (with breakfast). Provider, Historical   colchicine 0.6 mg tablet Take 0.6 mg by mouth daily as needed. Provider, Historical   tamsulosin (FLOMAX) 0.4 mg capsule Take 0.4 mg by mouth Daily (before dinner). Provider, Historical   simvastatin (ZOCOR) 10 mg tablet Take  by mouth nightly. Provider, Historical       No Known Allergies    Review of Systems  Gen: No fever, chills, malaise, weight loss/gain. Heent: No headache, rhinorrhea, epistaxis, ear pain, hearing loss, sinus pain, neck pain/stiffness, sore throat. Heart: No chest pain, palpitations, WILSON, pnd, or orthopnea. Resp: No cough, hemoptysis, wheezing and shortness of breath. GI: No nausea, vomiting, diarrhea, constipation, melena or hematochezia. : No urinary obstruction, dysuria or hematuria. Derm: No rash, new skin lesion or pruritis. Musc/skeletal: no bone or joint complains. Vasc: No edema, cyanosis or claudication. Endo: No heat/cold intolerance, no polyuria,polydipsia or polyphagia. Neuro: No unilateral weakness, numbness, tingling. No seizures. Heme: No easy bruising or bleeding. Physical Exam:     Physical Exam:  Visit Vitals  BP (!) 150/94   Pulse 99   Temp 97.8 °F (36.6 °C)   Resp 18   Ht 5' 8\" (1.727 m)   Wt 55 kg (121 lb 4.8 oz)   SpO2 100%   BMI 18.44 kg/m²      O2 Device: None (Room air)    Temp (24hrs), Av.1 °F (36.7 °C), Min:97.4 °F (36.3 °C), Max:99 °F (37.2 °C)    No intake/output data recorded. 701 - 0  In: 240 [P.O.:240]  Out: -     General:  Awake, cooperative, no distress. Head:  Normocephalic, without obvious abnormality, atraumatic. Eyes:  Conjunctivae/corneas clear, sclera anicteric, PERRL, EOMs intact. Nose: Nares normal. No drainage or sinus tenderness.    Throat: Lips, mucosa, and tongue normal.    Neck: Supple, symmetrical, trachea midline, no adenopathy. Lungs:   Clear to auscultation bilaterally. Heart:  Regular rate and rhythm, S1, S2 normal, no murmur, click, rub or gallop. Abdomen: Soft, non-tender. Bowel sounds normal. No masses,  No organomegaly. Extremities: Extremities normal, atraumatic, no cyanosis or edema. Capillary refill normal.   Pulses: 2+ and symmetric all extremities. Skin: Skin color as per ethnicty, turgor normal. No rashes or lesions   Neurologic: CNII-XII intact. No focal motor or sensory deficit. Labs Reviewed:    Recent Results (from the past 24 hour(s))   EKG, 12 LEAD, INITIAL    Collection Time: 05/24/22 10:41 AM   Result Value Ref Range    Ventricular Rate 105 BPM    Atrial Rate 105 BPM    P-R Interval 162 ms    QRS Duration 88 ms    Q-T Interval 360 ms    QTC Calculation (Bezet) 475 ms    Calculated P Axis 80 degrees    Calculated R Axis 9 degrees    Calculated T Axis -23 degrees    Diagnosis       Sinus tachycardia  Nonspecific ST and T wave abnormality  Abnormal ECG  Confirmed by Mana Valentino MD, [facility] ([id number]) on 5/24/2022 9:24:11 PM     CBC WITH AUTOMATED DIFF    Collection Time: 05/24/22 10:55 AM   Result Value Ref Range    WBC 12.1 4.6 - 13.2 K/uL    RBC 3.42 (L) 4.35 - 5.65 M/uL    HGB 10.2 (L) 13.0 - 16.0 g/dL    HCT 32.7 (L) 36.0 - 48.0 %    MCV 95.6 78.0 - 100.0 FL    MCH 29.8 24.0 - 34.0 PG    MCHC 31.2 31.0 - 37.0 g/dL    RDW 16.0 (H) 11.6 - 14.5 %    PLATELET 049 759 - 511 K/uL    MPV 10.9 9.2 - 11.8 FL    NRBC 0.0 0  WBC    ABSOLUTE NRBC 0.00 0.00 - 0.01 K/uL    NEUTROPHILS 60 40 - 73 %    LYMPHOCYTES 33 21 - 52 %    MONOCYTES 6 3 - 10 %    EOSINOPHILS 0 0 - 5 %    BASOPHILS 0 0 - 2 %    IMMATURE GRANULOCYTES 1 (H) 0.0 - 0.5 %    ABS. NEUTROPHILS 7.3 1.8 - 8.0 K/UL    ABS. LYMPHOCYTES 4.0 (H) 0.9 - 3.6 K/UL    ABS. MONOCYTES 0.8 0.05 - 1.2 K/UL    ABS. EOSINOPHILS 0.0 0.0 - 0.4 K/UL    ABS. BASOPHILS 0.0 0.0 - 0.1 K/UL    ABS. IMM. GRANS. 0.1 (H) 0.00 - 0.04 K/UL    DF AUTOMATED     METABOLIC PANEL, COMPREHENSIVE    Collection Time: 05/24/22 10:55 AM   Result Value Ref Range    Sodium 139 136 - 145 mmol/L    Potassium 5.4 3.5 - 5.5 mmol/L    Chloride 109 100 - 111 mmol/L    CO2 20 (L) 21 - 32 mmol/L    Anion gap 10 3.0 - 18 mmol/L    Glucose 115 (H) 74 - 99 mg/dL    BUN 47 (H) 7.0 - 18 MG/DL    Creatinine 2.41 (H) 0.6 - 1.3 MG/DL    BUN/Creatinine ratio 20 12 - 20      GFR est AA 32 (L) >60 ml/min/1.73m2    GFR est non-AA 26 (L) >60 ml/min/1.73m2    Calcium 9.1 8.5 - 10.1 MG/DL    Bilirubin, total 0.7 0.2 - 1.0 MG/DL    ALT (SGPT) 87 (H) 16 - 61 U/L    AST (SGOT) 57 (H) 10 - 38 U/L    Alk.  phosphatase 570 (H) 45 - 117 U/L    Protein, total 7.3 6.4 - 8.2 g/dL    Albumin 2.8 (L) 3.4 - 5.0 g/dL    Globulin 4.5 (H) 2.0 - 4.0 g/dL    A-G Ratio 0.6 (L) 0.8 - 1.7     TROPONIN-HIGH SENSITIVITY    Collection Time: 05/24/22 10:55 AM   Result Value Ref Range    Troponin-High Sensitivity 22 0 - 78 ng/L   ETHYL ALCOHOL    Collection Time: 05/24/22 10:55 AM   Result Value Ref Range    ALCOHOL(ETHYL),SERUM <3 0 - 3 MG/DL   TSH 3RD GENERATION    Collection Time: 05/24/22 10:55 AM   Result Value Ref Range    TSH 1.89 0.36 - 3.74 uIU/mL   TROPONIN-HIGH SENSITIVITY    Collection Time: 05/24/22  9:50 PM   Result Value Ref Range    Troponin-High Sensitivity 22 0 - 78 ng/L       Procedures/imaging: see electronic medical records for all procedures/Xrays and details which were not copied into this note but were reviewed prior to creation of Plan

## 2022-05-24 NOTE — ED NOTES
TRANSFER - OUT REPORT:    Verbal report given to Narayan Andrea RN (name) on Pilar Romero  being transferred to TELE(unit) for routine progression of care       Report consisted of patients Situation, Background, Assessment and   Recommendations(SBAR). Information from the following report(s) SBAR, ED Summary, MAR, Recent Results and Cardiac Rhythm Sinus Tachy, nonespecific ST and T wave abnormality  was reviewed with the receiving nurse. Lines:   Peripheral IV 05/24/22 Left Wrist (Active)   Site Assessment Clean, dry, & intact 05/24/22 1101   Phlebitis Assessment 0 05/24/22 1101   Infiltration Assessment 0 05/24/22 1101        Opportunity for questions and clarification was provided.       Patient transported with:   Registered Nurse

## 2022-05-24 NOTE — CONSULTS
[facility] Consult Note      Patient: Brandee Faust MRN: [id number]  SSN: [id number]    YOB: 1945  Age: 64 y.o. Sex: male    Date of Consultation: 05/24/2022  Referring Physician: Nanette Donaldson  Reason for Consultation: Syncope    Chief complain: Syncope    HPI:  78-year-old gentleman brought to emergency with episode of syncope. As per patient he was in a bathtub and tried to come outside with help of his brother in law and he passed out. As per patient he was sitting in the bathtub and when he tried to come out he passed out. As per patient he passed out for few minutes but not sure. He had almost similar episode few months ago. He is complaining of feeling of fatigue and tired. He is extremely weak. He has not walking much. He is complaining of loss of appetite and weight loss in last few months. Denies any chest pain or unusual shortness of breath. Denies any palpitation. Denies any current smoking. He is ex-smoker.   Cardiology consult called for evaluation of syncope    Past Medical History:   Diagnosis Date    Chronic kidney disease     stage 3 in 2011    CKD (chronic kidney disease)     ED (erectile dysfunction)     Gout     HTN (hypertension)     Mixed hyperlipidemia      Past Surgical History:   Procedure Laterality Date    HX COLONOSCOPY      HX ENDOSCOPY       Current Facility-Administered Medications   Medication Dose Route Frequency    sodium chloride (NS) flush 5-40 mL  5-40 mL IntraVENous Q8H    sodium chloride (NS) flush 5-40 mL  5-40 mL IntraVENous PRN    acetaminophen (TYLENOL) tablet 650 mg  650 mg Oral Q4H PRN    oxyCODONE IR (ROXICODONE) tablet 5 mg  5 mg Oral Q4H PRN    naloxone (NARCAN) injection 0.4 mg  0.4 mg IntraVENous PRN    bisacodyL (DULCOLAX) tablet 10 mg  10 mg Oral DAILY PRN    heparin (porcine) injection 5,000 Units  5,000 Units SubCUTAneous Q8H       Allergies and Intolerances:   No Known Allergies    Family History:   No family history on file.    Social History:   He  reports that he has quit smoking. He has never used smokeless tobacco.  He  reports no history of alcohol use. Review of Systems:     Gen: No fever, chills, malaise, positive fatigue, weight loss   Heent: No headache, rhinorrhea, epistaxis, ear pain, hearing loss, sinus pain, neck pain/stiffness, sore throat. Heart: No chest pain, palpitations, shortness of breath on exertion, pnd, or orthopnea. Resp: No cough, hemoptysis, wheezing and dyspnea  GI: No nausea, vomiting, diarrhea, constipation, melena or hematochezia. : No urinary obstruction, dysuria or hematuria. Derm: No rash, new skin lesion or pruritis. Musc/skeletal: positive bone or joint complains. Vasc: No edema, cyanosis or claudication. Endo: No heat/cold intolerance, no polyuria,polydipsia or polyphagia. Neuro: No unilateral weakness, numbness, tingling. No seizures. Heme: No easy bruising or bleeding. Physical:   Patient Vitals for the past 6 hrs:   Temp Pulse Resp BP SpO2   05/24/22 1810  (!) 104  115/70    05/24/22 1805  99  124/78    05/24/22 1800  100  130/80    05/24/22 1404 97.4 °F (36.3 °C) 96 16 136/85 100 %   05/24/22 1343  92 15 (!) 144/82 98 %         Exam:   General Appearance: Comfortable, not using accessory muscles of respiration, Cachectic. HEENT: LION. HEAD: Atraumatic  NECK: No JVD, no thyroidomeglay. CAROTIDS: No bruit  LUNGS: Clear bilaterally. HEART: S1+S2 audible, no murmur, no pericardial rub. ABD: Non-tender, BS Audible    EXT: No edema, and no cyanosis. VASCULAR EXAM: Pulses are intact. PSYCHIATRIC EXAM: Mood is appropriate. MUSCULOSKELETAL: Grossly no joint deformity.   NEUROLOGICAL: AAO times 3, Motor and sensory sytem intact    Review of Data:   LABS:   Lab Results   Component Value Date/Time    WBC 12.1 05/24/2022 10:55 AM    HGB 10.2 (L) 05/24/2022 10:55 AM    HCT 32.7 (L) 05/24/2022 10:55 AM    PLATELET 950 80/25/6181 10:55 AM     Lab Results Component Value Date/Time    Sodium 139 05/24/2022 10:55 AM    Potassium 5.4 05/24/2022 10:55 AM    Chloride 109 05/24/2022 10:55 AM    CO2 20 (L) 05/24/2022 10:55 AM    Glucose 115 (H) 05/24/2022 10:55 AM    BUN 47 (H) 05/24/2022 10:55 AM    Creatinine 2.41 (H) 05/24/2022 10:55 AM     No results found for: CHOL, CHOLX, CHLST, CHOLV, HDL, HDLP, LDL, LDLC, DLDLP, TGLX, TRIGL, TRIGP  No components found for: GPT  No results found for: HBA1C, CBT9LFPE, WVF2ACVV      Cardiology Procedures:   Results for orders placed or performed during the hospital encounter of 05/24/22   EKG, 12 LEAD, INITIAL   Result Value Ref Range    Ventricular Rate 105 BPM    Atrial Rate 105 BPM    P-R Interval 162 ms    QRS Duration 88 ms    Q-T Interval 360 ms    QTC Calculation (Bezet) 475 ms    Calculated P Axis 80 degrees    Calculated R Axis 9 degrees    Calculated T Axis -23 degrees    Diagnosis       Sinus tachycardia  Poor R wave progression  Nonspecific ST and T wave abnormality  Abnormal ECG             Impression / Plan:    Patient Active Problem List   Diagnosis Code    Microscopic hematuria R31.29    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

## 2022-05-24 NOTE — ED PROVIDER NOTES
EMERGENCY DEPARTMENT HISTORY AND PHYSICAL EXAM    Date: 5/24/2022  Patient Name: Jason Donis    History of Presenting Illness     Chief Complaint   Patient presents with    Syncope         History Provided By: Patient and EMS    11:14 AM  Jason Donis is a 64 y.o. male with PMHX of hypertension, chronic low back pain, CKD stage III who presents to the emergency department C/O syncopal episode just prior to arrival.  States he passed out while getting out of the bathtub. Per EMS as reported by son patient, the son was helping patient out of the bathtub when he had a syncopal episode. Son was holding onto him and lowered him to the ground there were no injuries. Son told EMS that patient was \"out\" for 3 to 5 minutes but upon EMS arrival he was awake and alert and they noted a FSBS of 148. Patient really has no complaints other than just being tired. He is also being worked up for recent significant weight loss fatigue and poor appetite. He denies any prodromal symptoms prior to his syncopal episode, dizziness, chest pain, shortness of breath, palpitations, nausea, vomiting, headache, vision changes, extremity numbness or weakness and any other sxs or complaints. Review of records shows that patient was seen at this facility 5 days ago for anemia stage III chronic kidney disease and acute on chronic low back pain. He was also seen at [facility] ER on 5/5/2022 for a syncopal episode and poor appetite.     PCP: Leavy Severs, NP    Current Facility-Administered Medications   Medication Dose Route Frequency Provider Last Rate Last Admin    sodium chloride (NS) flush 5-40 mL  5-40 mL IntraVENous Q8H Ezra Ahumada MD   10 mL at 05/24/22 1645    sodium chloride (NS) flush 5-40 mL  5-40 mL IntraVENous PRN Shahrzad Ahumada MD        acetaminophen (TYLENOL) tablet 650 mg  650 mg Oral Q4H PRN Shahrzad Ahumada MD        oxyCODONE IR (ROXICODONE) tablet 5 mg  5 mg Oral Q4H PRN Vern Cason MD        naloxone [facility]) injection 0.4 mg  0.4 mg IntraVENous PRN López Ahumada MD        bisacodyL (DULCOLAX) tablet 10 mg  10 mg Oral DAILY PRN López Ahumada MD        heparin (porcine) injection 5,000 Units  5,000 Units SubCUTAneous Q8H López Ahumada MD   5,000 Units at 05/24/22 1645       Past History     Past Medical History:  Past Medical History:   Diagnosis Date    Chronic kidney disease     stage 3 in 2011    CKD (chronic kidney disease)     ED (erectile dysfunction)     Gout     HTN (hypertension)     Mixed hyperlipidemia        Past Surgical History:  Past Surgical History:   Procedure Laterality Date    HX COLONOSCOPY      HX ENDOSCOPY         Family History:  No family history on file. Social History:  Social History     Tobacco Use    Smoking status: Former Smoker    Smokeless tobacco: Never Used   Substance Use Topics    Alcohol use: No     Alcohol/week: 0.0 standard drinks    Drug use: No       Allergies:  No Known Allergies      Review of Systems   Review of Systems   Constitutional: Positive for fatigue. Negative for fever. Respiratory: Negative for cough and shortness of breath. Cardiovascular: Negative for chest pain and palpitations. Gastrointestinal: Negative for abdominal pain, diarrhea and vomiting. Neurological: Positive for syncope. Negative for dizziness, weakness, light-headedness, numbness and headaches. All other systems reviewed and are negative. Physical Exam     Vitals:    05/24/22 1800 05/24/22 1805 05/24/22 1810 05/24/22 2001   BP: 130/80 124/78 115/70 (!) 147/90   Pulse: 100 99 (!) 104 96   Resp:    18   Temp:    98.2 °F (36.8 °C)   SpO2:    100%   Weight:       Height:         Physical Exam    Vital signs and nursing notes reviewed. CONSTITUTIONAL: Alert. Thin, chronically ill-appearing elderly male in no distress. HEAD: Normocephalic; atraumatic. EYES: PERRL; EOM's intact.  No nystagmus. Conjunctiva clear. ENT: External ear normal. Normal nose; no rhinorrhea. Normal pharynx. Mostly edentulous, dry mucous membranes, moist mucus membranes. NECK: Supple; FROM without difficulty, non-tender; no cervical lymphadenopathy. CV: Normal S1, S2; no murmurs, rubs, or gallops. No chest wall tenderness. RESPIRATORY: Normal chest excursion with respiration; breath sounds clear and equal bilaterally; no wheezes, rhonchi, or rales. GI: Non-distended; non-tender. BACK:  No evidence of trauma or deformity. Non-tender to palpation. FROM without difficulty. EXT: Normal ROM in all four extremities; non-tender to palpation. No edema or calf tenderness  SKIN: Normal for age and race; warm; dry; good turgor; no apparent lesions or exudate. NEURO: A & O x3. Cranial nerves 2-12 intact. Motor 5/5 bilaterally. Sensation intact. Normal speech. PSYCH:  Mood and affect appropriate.          Diagnostic Study Results     Labs -     Recent Results (from the past 12 hour(s))   EKG, 12 LEAD, INITIAL    Collection Time: 05/24/22 10:41 AM   Result Value Ref Range    Ventricular Rate 105 BPM    Atrial Rate 105 BPM    P-R Interval 162 ms    QRS Duration 88 ms    Q-T Interval 360 ms    QTC Calculation (Bezet) 475 ms    Calculated P Axis 80 degrees    Calculated R Axis 9 degrees    Calculated T Axis -23 degrees    Diagnosis       Sinus tachycardia  Nonspecific ST and T wave abnormality  Abnormal ECG  When compared with ECG of 14-OCT-2020 12:27,  premature atrial complexes are no longer present  Nonspecific T wave abnormality now evident in Lateral leads     CBC WITH AUTOMATED DIFF    Collection Time: 05/24/22 10:55 AM   Result Value Ref Range    WBC 12.1 4.6 - 13.2 K/uL    RBC 3.42 (L) 4.35 - 5.65 M/uL    HGB 10.2 (L) 13.0 - 16.0 g/dL    HCT 32.7 (L) 36.0 - 48.0 %    MCV 95.6 78.0 - 100.0 FL    MCH 29.8 24.0 - 34.0 PG    MCHC 31.2 31.0 - 37.0 g/dL    RDW 16.0 (H) 11.6 - 14.5 %    PLATELET 286 782 - 754 K/uL MPV 10.9 9.2 - 11.8 FL    NRBC 0.0 0  WBC    ABSOLUTE NRBC 0.00 0.00 - 0.01 K/uL    NEUTROPHILS 60 40 - 73 %    LYMPHOCYTES 33 21 - 52 %    MONOCYTES 6 3 - 10 %    EOSINOPHILS 0 0 - 5 %    BASOPHILS 0 0 - 2 %    IMMATURE GRANULOCYTES 1 (H) 0.0 - 0.5 %    ABS. NEUTROPHILS 7.3 1.8 - 8.0 K/UL    ABS. LYMPHOCYTES 4.0 (H) 0.9 - 3.6 K/UL    ABS. MONOCYTES 0.8 0.05 - 1.2 K/UL    ABS. EOSINOPHILS 0.0 0.0 - 0.4 K/UL    ABS. BASOPHILS 0.0 0.0 - 0.1 K/UL    ABS. IMM. GRANS. 0.1 (H) 0.00 - 0.04 K/UL    DF AUTOMATED     METABOLIC PANEL, COMPREHENSIVE    Collection Time: 05/24/22 10:55 AM   Result Value Ref Range    Sodium 139 136 - 145 mmol/L    Potassium 5.4 3.5 - 5.5 mmol/L    Chloride 109 100 - 111 mmol/L    CO2 20 (L) 21 - 32 mmol/L    Anion gap 10 3.0 - 18 mmol/L    Glucose 115 (H) 74 - 99 mg/dL    BUN 47 (H) 7.0 - 18 MG/DL    Creatinine 2.41 (H) 0.6 - 1.3 MG/DL    BUN/Creatinine ratio 20 12 - 20      GFR est AA 32 (L) >60 ml/min/1.73m2    GFR est non-AA 26 (L) >60 ml/min/1.73m2    Calcium 9.1 8.5 - 10.1 MG/DL    Bilirubin, total 0.7 0.2 - 1.0 MG/DL    ALT (SGPT) 87 (H) 16 - 61 U/L    AST (SGOT) 57 (H) 10 - 38 U/L    Alk. phosphatase 570 (H) 45 - 117 U/L    Protein, total 7.3 6.4 - 8.2 g/dL    Albumin 2.8 (L) 3.4 - 5.0 g/dL    Globulin 4.5 (H) 2.0 - 4.0 g/dL    A-G Ratio 0.6 (L) 0.8 - 1.7     TROPONIN-HIGH SENSITIVITY    Collection Time: 05/24/22 10:55 AM   Result Value Ref Range    Troponin-High Sensitivity 22 0 - 78 ng/L   ETHYL ALCOHOL    Collection Time: 05/24/22 10:55 AM   Result Value Ref Range    ALCOHOL(ETHYL),SERUM <3 0 - 3 MG/DL   TSH 3RD GENERATION    Collection Time: 05/24/22 10:55 AM   Result Value Ref Range    TSH 1.89 0.36 - 3.74 uIU/mL       Radiologic Studies -   XR CHEST PORT   Final Result      No acute pulmonary process identified.          CT Results  (Last 48 hours)    None        CXR Results  (Last 48 hours)               05/24/22 1123  XR CHEST PORT Final result    Impression:      No acute pulmonary process identified. Narrative:  EXAM: One-view chest       CLINICAL HISTORY: weakness, syncope ,       COMPARISON: None       FINDINGS:       Frontal view of the chest demonstrate clear lungs. Cardiac silhouette is normal   in size and contour. No acute bony or soft tissue abnormality. Medications given in the ED-  Medications   sodium chloride (NS) flush 5-40 mL (10 mL IntraVENous Given 5/24/22 1645)   sodium chloride (NS) flush 5-40 mL (has no administration in time range)   acetaminophen (TYLENOL) tablet 650 mg (has no administration in time range)   oxyCODONE IR (ROXICODONE) tablet 5 mg (has no administration in time range)   naloxone [facility]) injection 0.4 mg (has no administration in time range)   bisacodyL (DULCOLAX) tablet 10 mg (has no administration in time range)   heparin (porcine) injection 5,000 Units (5,000 Units SubCUTAneous Given 5/24/22 1645)   sodium chloride 0.9 % bolus infusion 250 mL (0 mL IntraVENous IV Completed 5/24/22 1125)         Medical Decision Making   I am the first provider for this patient. I reviewed the vital signs, available nursing notes, past medical history, past surgical history, family history and social history. Vital Signs-Reviewed the patient's vital signs. Pulse Oximetry Analysis - 100% on RA     Cardiac Monitor:  Rate: 96 bpm  Rhythm: NSR    EKG interpretation: (Preliminary)  Sinus tachycardia, rate 105, nonspecific ST changes, borderline ST depression in lateral leads, no STEMI    Records Reviewed: Nursing Notes and Old Medical Records      Procedures:  Procedures    ED Course:  11:14 AM   Initial assessment performed. The patients presenting problems have been discussed, and they are in agreement with the care plan formulated and outlined with them. I have encouraged them to ask questions as they arise throughout their visit.     12 PM consult note  Case discussed with ED attending Dr. Paul Crawford who agrees with consulting hospitalist ultimately for admission for recurrent syncope. 12:45 PM progress  Patient states he is not yet able to urinate but refused catheter for specimen collection. 1:12 PM consult note  Case discussed with hospitalist , who will admit to telemetry floor. Provider Notes (Medical Decision Making): Jessica Plata is a 64 y.o. male with history of stage III chronic kidney disease hypertension and recent weight loss presents with his second syncopal episode within 1 month. On arrival he is alert and oriented, no head or other injuries and his neurologic exam is normal.  He has no complaints other than just feeling tired. His EKG shows possibly borderline ST depression in 2 lateral leads but otherwise unchanged from previous, his labs are grossly unremarkable/at baseline and troponin is 22. Chest x-ray shows no acute abnormalities. No definitive cause of his recent weight loss at this time but does require further work-up and admission for recurrent syncope, [facility] syncope rule is positive. Diagnosis and Disposition     ADMISSION NOTE:  1:14 PM  Patient is being admitted to the hospital by Dr. Cyrus Morales. The results of their tests and reasons for their admission have been discussed with them and/or available family. They convey agreement and understanding for the need to be admitted and for their admission diagnosis. CONDITIONS ON ADMISSION:  Deep Vein Thrombosis is not present at the time of admission. Thrombosis is not present at the time of admission. Urinary Tract Infection unknown at the time of admission. Pneumonia is not present at the time of admission. MRSA is not present at the time of admission. Wound infection is not present at the time of admission. Pressure Ulcer is not present at the time of admission. CLINICAL IMPRESSION:    1. Recurrent syncope    2. Weight loss    3.  Stage 3 chronic kidney disease, unspecified whether stage 3a or 3b CKD ([facility]        PLAN:  1. Admit        Please note that this dictation was completed with [employer] the computer voice recognition software. Quite often unanticipated grammatical, syntax, homophones, and other interpretive errors are inadvertently transcribed by the computer software. Please disregard these errors. Please excuse any errors that have escaped final proofreading.

## 2022-05-24 NOTE — PROGRESS NOTES
Orthostatic vitals  Vitals:    05/24/22 1404 05/24/22 1800 05/24/22 1805 05/24/22 1810   BP: 136/85 130/80 124/78 115/70   BP 1 Location:       BP Patient Position:  At rest;Lying Sitting Standing   Pulse: 96 100 99 (!) 104   Temp: 97.4 °F (36.3 °C)      Resp: 16      Height:       Weight:       SpO2: 100%

## 2022-05-24 NOTE — ED TRIAGE NOTES
Pt arrive to the via ems with c/o of syncope. Per report from ems, pt was having a bath today this AM, son was help pt out of bath when an episode of syncopal happened. Pt did not fall. PMHX of HTN, stage 3 renal disease. family also report lack of appetite. Pt able to speak in full sentences. NADN at this time.

## 2022-05-24 NOTE — PROGRESS NOTES
[address] [facility] IN REPORT:    Verbal report received from [address] (name) on Dru Deshpande  being received from ED (unit) for routine progression of care      Report consisted of patients Situation, Background, Assessment and   Recommendations(SBAR). Information from the following report(s) SBAR, Kardex, ED Summary, [facility] and Cardiac Rhythm SR was reviewed with the receiving nurse. Opportunity for questions and clarification was provided. Assessment completed upon patients arrival to unit and care assumed.

## 2022-05-25 PROBLEM — I10 HYPERTENSION: Status: ACTIVE | Noted: 2022-01-01

## 2022-05-25 PROBLEM — R53.81 DEBILITY: Status: ACTIVE | Noted: 2022-01-01

## 2022-05-25 PROBLEM — E43 SEVERE PROTEIN-CALORIE MALNUTRITION (GOMEZ: LESS THAN 60% OF STANDARD WEIGHT) (HCC): Status: ACTIVE | Noted: 2022-01-01

## 2022-05-25 PROBLEM — R63.4 WEIGHT LOSS: Status: ACTIVE | Noted: 2022-01-01

## 2022-05-25 PROBLEM — N18.32 STAGE 3B CHRONIC KIDNEY DISEASE (HCC): Status: ACTIVE | Noted: 2022-01-01

## 2022-05-25 NOTE — PROGRESS NOTES
Cardiology Progress Note        Patient: Gordon Mckeon        Sex: male          DOA: 5/24/2022  YOB: 1945      Age:  64 y.o.        LOS:  LOS: 1 day      Patient seen and examined, chart reviewed. 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   Left Ventricle: Mildly reduced left ventricular systolic function with a visually estimated EF of 45 - 50%. Left ventricle size is normal. Normal wall thickness. Mild global hypokinesis present. Grade I diastolic dysfunction with normal LAP.   Right Ventricle: Mildly reduced systolic function. TAPSE is 1.2 cm.   Pulmonary Arteries: Mild pulmonary hypertension present. The estimated PASP is 36 mmHg.   Pericardium: Trivial localized pericardial effusion present around the right ventricle. No indication of cardiac tamponade. CT abdomen reported     Limited noncontrast exam showing extensive abdominal malignancy.     Large pancreatic tail mass, peritoneal carcinomatosis, small volume ascites,  extensive liver metastatic disease.     Amorphous incompletely characterized masslike process involving the terminal  ileum with extensive surrounding stranding -- this could reflect direct  neoplastic involvement or alternatively be infectious/inflammatory (or a  combination).      Plan:    Syncope is most likely due to intravascular depletion and dehydration due to poor oral intake  If blood pressure permits will consider low-dose of beta-blocker. Patient has extensive abdominal malignancy. Prognosis is guarded. Plan discussed with family member at bedside. Subjective:    cc:    Complaining of feeling fatigue, loss of appetite      REVIEW OF SYSTEMS:     General: No fevers or chills. Cardiovascular: No chest pain,No palpitations, No orthopnea, No PND, No leg swelling, No claudication  Pulmonary: No shortness of breath. Gastrointestinal: No nausea, vomiting, bleeding  Neurology: No Dizziness    Objective:      Visit Vitals  /74 (BP 1 Location: Left upper arm, BP Patient Position: At rest)   Pulse 71   Temp 98 °F (36.7 °C)   Resp 18   Ht 5' 8\" (1.727 m)   Wt 54.9 kg (121 lb)   SpO2 100%   BMI 18.40 kg/m²     Body mass index is 18.4 kg/m². Physical Exam:  General Appearance: Comfortable, not using accessory muscles of respiration. HEENT: LION. HEAD: Atraumatic  NECK: No JVD, no thyroidomeglay. CAROTIDS: no bruit  LUNGS: Clear bilaterally. HEART: S1+S2 audible, no murmur, no pericardial rub.  NEUROLOGICAL: Alert, follows verbal command    Medication:  Current Facility-Administered Medications   Medication Dose Route Frequency    tamsulosin (FLOMAX) capsule 0.4 mg  0.4 mg Oral ACD    dextrose 5 % - 0.45% NaCl infusion  50 mL/hr IntraVENous CONTINUOUS    famotidine (PF) (PEPCID) 20 mg in 0.9% sodium chloride 10 mL injection  20 mg IntraVENous Q12H    ondansetron (ZOFRAN) injection 4 mg  4 mg IntraVENous Q6H PRN    sodium chloride (NS) flush 5-40 mL  5-40 mL IntraVENous Q8H    sodium chloride (NS) flush 5-40 mL  5-40 mL IntraVENous PRN    acetaminophen (TYLENOL) tablet 650 mg  650 mg Oral Q4H PRN    oxyCODONE IR (ROXICODONE) tablet 5 mg  5 mg Oral Q4H PRN    naloxone (NARCAN) injection 0.4 mg  0.4 mg IntraVENous PRN    bisacodyL (DULCOLAX) tablet 10 mg  10 mg Oral DAILY PRN    heparin (porcine) injection 5,000 Units  5,000 Units SubCUTAneous Q8H               Lab/Data Reviewed:       Recent Labs 05/24/22  1055   WBC 12.1   HGB 10.2*   HCT 32.7*        Recent Labs     05/25/22  0350 05/24/22  1055    139   K 4.7 5.4    109   CO2 22 20*   GLU 98 115*   BUN 50* 47*   CREA 2.33* 2.41*   CA 9.3 9.1       Signed By: Danny Parekh MD     May 25, 2022

## 2022-05-25 NOTE — PROGRESS NOTES
Comprehensive Nutrition Assessment    Type and Reason for Visit: Initial,Consult (BMI)    Nutrition Recommendations/Plan:   1. Continue with current diet order and ensure enlive TID to help meet needs     Malnutrition Assessment:  Malnutrition Status:  Severe malnutrition (05/25/22 1249)    Context:  Chronic illness     Findings of the 6 clinical characteristics of malnutrition:   Energy Intake:  Unable to assess  Weight Loss:  Greater than 5% over 1 month     Body Fat Loss:  Severe body fat loss, Triceps,Orbital,Buccal region   Muscle Mass Loss:  Severe muscle mass loss, Calf (gastrocnemius),Clavicles (pectoralis &deltoids),Thigh (quadriceps),Temples (temporalis),Hand (interosseous)  Fluid Accumulation:  Unable to assess,     Strength:  Not performed     Nutrition Assessment:    PMHx: HTN, chronic low back pain, CKD stage 3. Admitted for syncope, weight lost, weakness and debility. Noted per H&P, recent significant weight lost and poor appetite; 2-3 ensure/day. Nutrition Related Findings:    Lab: GFR est AA 33, BUN 50, creatinine 2.33. Med: flomax, vit d. Noted PO intake of 51-75% x1 (5/24). Noted 2 ensure enlive on table- one was open (~25-50% intake). No BM noted at this time. Pt reports poor intake however did not specify how long or when it started. Stated eating 2 meals/day with poor intake. drinks  3 ensure/day. Noted pt with poor teeth- eats only food pt is able to chew such as soups. Pt did mention weight lost. Stated was 150lb about 2 months ago and is now 114lb. Noted current weight of 121lb.  Weight hx per chart review:  159lb 5/12/2021  158lb 10/21/2021  132lb 4/7/2022  127lb 5/5/2022     Wound Type: None    Current Nutrition Intake & Therapies:  Average Meal Intake: 51-75%  Average Supplement Intake: Unable to assess  ADULT DIET Regular  ADULT ORAL NUTRITION SUPPLEMENT Breakfast, Lunch, Dinner; Standard High Calorie/High Protein    Anthropometric Measures:  Height: 5' 8\" (172.7 cm)  Ideal Body Weight (IBW): 154 lbs (70 kg)  Admission Body Weight: 121 lb  Current Body Wt:  55 kg (121 lb 4.1 oz), 78.7 % IBW.  Bed scale  Current BMI (kg/m2): 18.4  Usual Body Weight: 59.9 kg (132 lb) (4/7/2022)  % Weight Change (Calculated): -8.1  Weight Adjustment: No adjustment                 BMI Category: Underweight (BMI less than 22) age over 72    Estimated Daily Nutrient Needs:  Energy Requirements Based On: Kcal/kg  Weight Used for Energy Requirements: Current  Energy (kcal/day): 1650  Weight Used for Protein Requirements: Current  Protein (g/day): 66  Method Used for Fluid Requirements: 1 ml/kcal  Fluid (ml/day): 4392    Nutrition Diagnosis:   · Severe malnutrition related to inadequate protein-energy intake as evidenced by poor intake prior to admission,weight loss greater than or equal to 5% in 1 month,severe loss of subcutaneous fat,severe muscle loss      Nutrition Interventions:   Food and/or Nutrient Delivery: Continue current diet,Continue oral nutrition supplement  Nutrition Education/Counseling: No recommendations at this time  Coordination of Nutrition Care: Continue to monitor while inpatient       Goals:     Goals: PO intake 50% or greater,within 2 days       Nutrition Monitoring and Evaluation:   Behavioral-Environmental Outcomes: None identified  Food/Nutrient Intake Outcomes: Food and nutrient intake,Supplement intake  Physical Signs/Symptoms Outcomes: Biochemical data,Meal time behavior,Nutrition focused physical findings,Skin,Weight    Discharge Planning:    Continue current diet,Continue oral nutrition supplement    Laurie Borges RD

## 2022-05-25 NOTE — PROGRESS NOTES
Problem: Mobility Impaired (Adult and Pediatric)  Goal: *Acute Goals and Plan of Care (Insert Text)  Description: Physical Therapy Goals  Initiated 5/25/2022 and to be accomplished within 7 day(s)  1. Patient will move from supine to sit and sit to supine  in bed with supervision/set-up. 2.  Patient will transfer from bed to chair and chair to bed with supervision/set-up using the least restrictive device. 3.  Patient will perform sit to stand with supervision/set-up. 4.  Patient will ambulate with supervision/set-up for 100 feet with the least restrictive device. Note:   physical Therapy EVALUATION    Patient: Berkley Chacko (72 y.o. male)  Date: 5/25/2022  Primary Diagnosis: Recurrent syncope [R55]  Precautions:   Fall    ASSESSMENT :  Based on the objective data described below, the patient presents with lower extremity weakness, decreased gait quality and endurance, impaired bed mobility and transfers, and decreased activity tolerance. Pt performed supine to sit with CGA /79, sit to stand with CGA. Patient ambulated 30 feet with RW, GB applied, CGA. BP following ambulation 127/83. No report of dizziness or lightheadedness. Pt fatigues very quickly and only able tolerate short distance ambulation then requesting to return to supine. Patient would benefit from skilled inpatient physical therapy to address deficits, progress as tolerated to achieve long term goals and allow safe discharge. Patient will benefit from skilled intervention to address the above impairments.   Patients rehabilitation potential is considered to be Guarded  Factors which may influence rehabilitation potential include:   []         None noted  []         Mental ability/status  [x]         Medical condition  []         Home/family situation and support systems  []         Safety awareness  []         Pain tolerance/management  []         Other:      PLAN :  Recommendations and Planned Interventions:  [x]           Bed Mobility Training             []    Neuromuscular Re-Education  [x]           Transfer Training                   []    Orthotic/Prosthetic Training  [x]           Gait Training                          []    Modalities  [x]           Therapeutic Exercises          []    Edema Management/Control  [x]           Therapeutic Activities            [x]    Patient and Family Training/Education  []           Other (comment):    Frequency/Duration: Patient will be followed by physical therapy 1-2 times per day to address goals. Discharge Recommendations: Home Health versus SNF  Further Equipment Recommendations for Discharge: rolling walker     SUBJECTIVE:   Patient stated I am tired.     OBJECTIVE DATA SUMMARY:     Past Medical History:   Diagnosis Date    Chronic kidney disease     stage 3 in 2011    CKD (chronic kidney disease)     ED (erectile dysfunction)     Gout     HTN (hypertension)     Mixed hyperlipidemia      Past Surgical History:   Procedure Laterality Date    HX COLONOSCOPY      HX ENDOSCOPY       Barriers to Learning/Limitations: None  Compensate with: Visual Cues, Verbal Cues, and Tactile Cues  Prior Level of Function/Home Situation: Independent ambulation without AD  Home Situation  Home Environment: Private residence  # Steps to Enter: 0  One/Two Story Residence: One story  Living Alone: No  Support Systems: Other Family Member(s) (sister)  Critical Behavior:  Neurologic State: Alert  Orientation Level: Oriented X4  Cognition: Appropriate decision making; Appropriate for age attention/concentration; Appropriate safety awareness  Strength:    Strength: Generally decreased, functional  Tone & Sensation:   Tone: Normal  Sensation: Impaired  Range Of Motion:  AROM: Generally decreased, functional  Functional Mobility:  Bed Mobility:   Supine to Sit: Contact guard assistance  Sit to Supine: Contact guard assistance   Transfers:  Sit to Stand: Contact guard assistance  Stand to Sit: Contact guard assistance  Balance:   Sitting: Intact  Standing: Impaired; With support  Standing - Static: Fair  Standing - Dynamic : Fair  Ambulation/Gait Training:  Distance (ft): 30 Feet (ft)  Assistive Device: Gait belt;Walker, rolling  Ambulation - Level of Assistance: Contact guard assistance  Gait Description (WDL): Exceptions to WDL  Gait Abnormalities: Decreased step clearance  Base of Support: Narrowed   Speed/Trinity: Slow  Step Length: Right shortened;Left shortened  Pain:  Pain Scale 1: Numeric (0 - 10)  Pain Intensity 1: 5  Pain Location 1: Back  Pain Orientation 1: Lower  Pain Description 1: Aching;Dull  Pain Intervention(s) 1: Medication (see MAR)  Activity Tolerance:   Fair  Please refer to the flowsheet for vital signs taken during this treatment. After treatment:   []         Patient left in no apparent distress sitting up in chair  [x]         Patient left in no apparent distress in bed  [x]         Call bell left within reach  [x]         Nursing notified  []         Caregiver present  []         Bed alarm activated    COMMUNICATION/EDUCATION:   [x]         Fall prevention education was provided and the patient/caregiver indicated understanding. [x]         Patient/family have participated as able in goal setting and plan of care. [x]         Patient/family agree to work toward stated goals and plan of care. []         Patient understands intent and goals of therapy, but is neutral about his/her participation. []         Patient is unable to participate in goal setting and plan of care.     Thank you for this referral.  Tayla Odom   Time Calculation: 13 mins   Eval Complexity: History: MEDIUM  Complexity : 1-2 comorbidities / personal factors will impact the outcome/ POC Exam:MEDIUM Complexity : 3 Standardized tests and measures addressing body structure, function, activity limitation and / or participation in recreation  Presentation: MEDIUM Complexity : Evolving with changing characteristics Clinical Decision Making:Medium Complexity    Overall Complexity:MEDIUM

## 2022-05-25 NOTE — PROGRESS NOTES
Hospitalist Progress Note    Patient: Leann Olvera MRN: [id number]  CSN: [id number]    YOB: 1945  Age: 64 y.o.   Sex: male    DOA: 5/24/2022 LOS:  LOS: 1 day          Chief Complaint:    syncope      Assessment/Plan     Leann Olvera is a 64 y.o. male with PMHX of hypertension, chronic low back pain, CKD stage III who presents to the emergency department C/O syncopal episode just prior to arrival.    Admitted for syncope.     Syncope  Cardiac monitoring  Cardiology consult, appreciate Dr. Kedar Orellana expertise  CT head shows chronic CVA, nil acute    Recurrent episode, had a similar episode 5/5/2022 and [facility]  Was diagnosed with UTI 5 days ago and given 5 days of Levaquin  Orthostatic BP monitoring    Echo result pending  troponin wnl     Weight loss -  Plan noncontrast CT abd/pelvis  TSH wnl  Check B12, folate, iron levels    Mild anemia    Nutrition consult     General debility and weakness -  Nutrition consult  PT/OT consults  Likely will need SNF placement      Hypertension -  Resume meds when needed     CKD, Stage 3-  No acute issues     Cachexia, severe prot tom malnutrition-looks progressive over some time likely     dvt and gi prophylaxis       Disposition :  Patient Active Problem List   Diagnosis Code    Microscopic hematuria R31.29    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    Recurrent syncope R55    Weight loss R63.4    Debility R53.81    Hypertension I10    Stage 3b chronic kidney disease ([address] N18.32    Severe protein-calorie malnutrition ([place] Stare: less than 60% of standard weight) ([address] E43       Subjective:  im weak  I threw up after I tried to drink  Denies abd pain  No CP or SOB  States he has no appetite for awhile      Review of systems:    Constitutional: denies fevers, chills  Respiratory: denies SOB, cough  Cardiovascular: denies chest pain  Gastrointestinal: denies diarrhea      Vital signs/Intake and Output:  Visit Vitals  BP (!) 135/91   Pulse 98   Temp 97.8 °F (36.6 °C)   Resp 18   Ht 5' 8\" (1.727 m)   Wt 54.9 kg (121 lb)   SpO2 100%   BMI 18.40 kg/m²     Current Shift:  05/25 0701 - 05/25 1900  In: 240 [P.O.:240]  Out: 300 [Urine:300]  Last three shifts:  05/23 1901 - 05/25 0700  In: 480 [P.O.:480]  Out: 100 [Urine:100]    Exam:    General: elderly cachectic AAM, NAD  Head/Neck: NCAT, supple, No masses, No lymphadenopathy  CVS:Regular rate and rhythm, no M/R/G, S1/S2 heard, no thrill  Lungs:Clear to auscultation bilaterally, no wheezes, rhonchi, or rales  Abdomen: Soft, Nontender, No distention, Normal Bowel sounds  Extremities: No C/C/E, pulses palpable 2+  Neuro:grossly normal , follows commands  Psych:appropriate                Labs: Results:       Chemistry Recent Labs     05/25/22  0350 05/24/22  1055   GLU 98 115*    139   K 4.7 5.4    109   CO2 22 20*   BUN 50* 47*   CREA 2.33* 2.41*   CA 9.3 9.1   AGAP 9 10   BUCR 21* 20   AP  --  570*   TP  --  7.3   ALB  --  2.8*   GLOB  --  4.5*   AGRAT  --  0.6*      CBC w/Diff Recent Labs     05/24/22  1055   WBC 12.1   RBC 3.42*   HGB 10.2*   HCT 32.7*      GRANS 60   LYMPH 33   EOS 0      Cardiac Enzymes No results for input(s): CPK, CKND1, GERBER in the last 72 hours. No lab exists for component: CKRMB, TROIP   Coagulation No results for input(s): PTP, INR, APTT, INREXT, INREXT in the last 72 hours. Lipid Panel No results found for: CHOL, CHOLPOCT, CHOLX, CHLST, CHOLV, 763566, HDL, HDLP, LDL, LDLC, DLDLP, 464192, VLDLC, VLDL, TGLX, TRIGL, TRIGP, TGLPOCT, CHHD, CHHDX   BNP No results for input(s): BNPP in the last 72 hours.    Liver Enzymes Recent Labs     05/24/22  1055   TP 7.3   ALB 2.8*   *      Thyroid Studies Lab Results   Component Value Date/Time    TSH 1.89 05/24/2022 10:55 AM        Procedures/imaging: see electronic medical records for all procedures/Xrays and details which were not copied into this note but were reviewed prior to creation of Jennifer Wesley MD

## 2022-05-25 NOTE — CONSULTS
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 Not a candidate for surgery with malnutrion and age. Multiple areas of intra abdominal cancer probably. Best option is palliative care with PEG or IR tub placement.

## 2022-05-25 NOTE — PROGRESS NOTES
Problem: Self Care Deficits Care Plan (Adult)  Goal: *Acute Goals and Plan of Care (Insert Text)  Description: Occupational Therapy Goals  Initiated 5/25/2022 within 7 day(s). 1.  Patient will perform grooming with supervision/set-up standing at sink for 5 minutes. 2.  Patient will perform upper body dressing with independence. 3.  Patient will perform lower body dressing with supervision/set-up. 4.  Patient will perform toilet transfers with supervision/set-up. 5.  Patient will perform all aspects of toileting with supervision/set-up. 6.  Patient will participate in upper extremity therapeutic exercise/activities with supervision/set-up for 10 minutes. 7.  Patient will utilize energy conservation techniques during functional activities with verbal, visual, and tactile cues. OCCUPATIONAL THERAPY EVALUATION    Patient: Jeral Hatchet (89 y.o. male)  Date: 5/25/2022  Primary Diagnosis: Recurrent syncope [R55]        Precautions:   Fall  PLOF: mod I for ADLs and transfers    ASSESSMENT :  Based on the objective data described below, the patient presents with decreased strength, endurance and balance for carryover of ADLs and transfers following above mentioned medical diagnosis. Pt reports 10/10 pain at LB at the beginning of session. Pt co-treat with PT for the need of another set of skilled hands and safety with transfers/ADLs. Pt performed bed mobility, supine<>sit, sit<>Stand transfers and ambulate in room with PT with CG-SBA. Pt was left supine in bed at the end of session in [facility]. Patient will benefit from skilled intervention to address the above impairments.   Patient's rehabilitation potential is considered to be Good  Factors which may influence rehabilitation potential include:   [x]             None noted  []             Mental ability/status  []             Medical condition  []             Home/family situation and support systems  []             Safety awareness  []             Pain tolerance/management  []             Other:      PLAN :  Recommendations and Planned Interventions:   [x]               Self Care Training                  [x]      Therapeutic Activities  [x]               Functional Mobility Training   []      Cognitive Retraining  [x]               Therapeutic Exercises           [x]      Endurance Activities  [x]               Balance Training                    []      Neuromuscular Re-Education  []               Visual/Perceptual Training     [x]      Home Safety Training  [x]               Patient Education                   [x]      Family Training/Education  []               Other (comment):    Frequency/Duration: Patient will be followed by occupational therapy 1-2 times per day/4-7 days per week to address goals. Discharge Recommendations: Home Health and To Be Determined  Further Equipment Recommendations for Discharge: N/A     SUBJECTIVE:   Patient stated  I am doing alright.     OBJECTIVE DATA SUMMARY:     Past Medical History:   Diagnosis Date    Chronic kidney disease     stage 3 in 2011    CKD (chronic kidney disease)     ED (erectile dysfunction)     Gout     HTN (hypertension)     Mixed hyperlipidemia      Past Surgical History:   Procedure Laterality Date    HX COLONOSCOPY      HX ENDOSCOPY       Barriers to Learning/Limitations: None  Compensate with: visual, verbal, tactile, kinesthetic cues/model    Home Situation:   Home Situation  Home Environment: Private residence  # Steps to Enter: 0  One/Two Story Residence: One story  Living Alone: No  Support Systems: Other Family Member(s) (sister)  Patient Expects to be Discharged to[de-identified] Home  Current DME Used/Available at Home: Cane, straight  Tub or Shower Type: Tub/Shower combination  []  Right hand dominant   []  Left hand dominant    Cognitive/Behavioral Status:  Neurologic State: Alert  Orientation Level: Oriented X4  Cognition: Appropriate for age attention/concentration; Follows commands  Safety/Judgement: Fall prevention    Skin: intact  Edema: none    Vision/Perceptual:    Tracking: Able to track stimulus in all quadrants w/o difficulty    Coordination: BUE  Coordination: Within functional limits  Fine Motor Skills-Upper: Left Intact; Right Intact    Gross Motor Skills-Upper: Left Intact; Right Intact  Balance:  Sitting: Intact  Standing: Impaired; With support  Standing - Static: Fair  Standing - Dynamic : Fair  Strength: BUE  Strength: Generally decreased, functional  Tone & Sensation: BUE  Tone: Normal  Sensation: Intact  Range of Motion: BUE  AROM: Generally decreased, functional  Functional Mobility and Transfers for ADLs:  Bed Mobility:  Supine to Sit: Contact guard assistance  Sit to Supine: Contact guard assistance  Transfers:  Sit to Stand: Contact guard assistance  Stand to Sit: Contact guard assistance  ADL Assessment:   Feeding: Independent    Oral Facial Hygiene/Grooming: Contact guard assistance    Upper Body Dressing: Contact guard assistance    Lower Body Dressing: Contact guard assistance    Toileting: Contact guard assistance  ADL Intervention:  Cognitive Retraining  Safety/Judgement: Fall prevention  Pain:  Pain level pre-treatment: 10/10   Pain level post-treatment: 10/10   Pain Intervention(s): Medication (see MAR); Rest, Ice, Repositioning   Response to intervention: Nurse notified, See doc flow    Activity Tolerance:   Good     Please refer to the flowsheet for vital signs taken during this treatment. After treatment:   [] Patient left in no apparent distress sitting up in chair  [x] Patient left in no apparent distress in bed  [x] Call bell left within reach  [x] Nursing notified  [x] Caregiver present  [] Bed alarm activated    COMMUNICATION/EDUCATION:   [x] Role of Occupational Therapy in the acute care setting  [x] Home safety education was provided and the patient/caregiver indicated understanding. [x] Patient/family have participated as able in goal setting and plan of care.   [x] Patient/family agree to work toward stated goals and plan of care. [] Patient understands intent and goals of therapy, but is neutral about his/her participation. [] Patient is unable to participate in goal setting and plan of care. Thank you for this referral.  Arielle Chase OTR/L  Time Calculation: 16 mins    Eval Complexity: History: LOW Complexity : Brief history review ; Examination: LOW Complexity : 1-3 performance deficits relating to physical, cognitive , or psychosocial skils that result in activity limitations and / or participation restrictions ;    Decision Making:LOW Complexity : No comorbidities that affect functional and no verbal or physical assistance needed to complete eval tasks

## 2022-05-25 NOTE — PROGRESS NOTES
Hospitalist Progress Note    Patient: Shannan Faria MRN: [id number]  CSN: [id number]    YOB: 1945  Age: 64 y.o.   Sex: male    DOA: 5/24/2022 LOS:  LOS: 1 day          Unfortunately the CT scan shows extensive abdominal malignancy with pancreatic tail mess, ascites, and liver mets, as well as carcinomatosis    I updated his sister Ms Almita Paul and Cecilio Park as well    Palliative team updated for further discussions to be had    Will consult surgery to see if impending bowel obstruction needs attention    Prognosis will be dismal unfortunately

## 2022-05-25 NOTE — PROGRESS NOTES
Called to ssend patient for CT of Head--0520  Glory Ramírez RN 3N spoke with Dr Lorri Hugo and patient will have transport in the morning.

## 2022-05-25 NOTE — ACP (ADVANCE CARE PLANNING)
Advance Care Planning   Advance Care Planning Inpatient Note  [facility]    Today's Date: 5/25/2022  Unit: THE [address] CARDIAC/MEDICAL    Received request from [facility]. Upon review of chart and communication with care team, patient's decision making abilities are not in question. Patient was/were present in the room during visit. Goals of ACP Conversation:  Discuss Advance Care planning documents    Health Care Decision Makers:    No healthcare decision makers have been documented. Click here to complete [address] including selection of the Healthcare Decision Maker Relationship (ie \"Primary\")    Summary:  [address]  Patient stated his sister Gisela Morales is his spokes person [phone]  Advance Care Planning Documents (Patient Wishes) on file:  Patient stated he has an ACP at home.  encouraged him to have a copy brought to the Hospital.  He stated he does not have My Chart access.      Interventions:  Requested patient/family submit existing document(s) for our records: [address] of /Advance Directive appointment of Health care agent  Living Will/ Advance Directive    Care Preferences Communicated:  No    Outcomes/Plan:  ACP Discussion Completed    Chaplain Lissette on 5/25/2022 at 10:58 AM

## 2022-05-25 NOTE — ACP (ADVANCE CARE PLANNING)
Advance Care Planning     General Advance Care Planning (ACP) Conversation      Date of Conversation: 5/25/2022    Conducted with: Patient, Graham Hemphill NP (Palliative) and this writer. Healthcare Decision Maker:     Patient reported that he has an advance medical directive (AMD) that lists his sister Corby Magana, HR#[phone]) as his primary decision maker. The Palliative Care team has placed a call to Corby Magana, in an attempt to obtain a copy of the AMD; for patient's EMR. Voicemail was left for a return call. Content/Action Overview: This writer, along with Graham Hemphill NP, with the Palliative Care team; visited with patient today to offer support and to also discuss advance medical directive and goals of care. Patient was laying in bed and alert and oriented x 4. Patient reported that he resides with his sister Michael Hardy) and her . Patient reported that he has had a significant weight loss over the past couple of months. He reported that he is fairly independent with his ADLs and IADLs. He reported that he has a cane that he sometimes uses for mobility purposes. When asked what happened that brought him to the hospital; he replied, \"I blacked out\". Patient's brother-in-law was helping him out of the tub, with the episode happened. Patient stated that he had another black out episode once before. He stated that remembers the black out event. When asked about AMD or POA; patient stated that he has an AMD that lists his sister as his primary decision maker. The Palliative Care team phoned patient's sister to obtain a copy of the AMD. The Palliative Care team had to leave a voicemail. Patient has 4 living siblings, including Sharon Richardson who is the youngest. Patient was then asked about goals of care. Patient was educated on the risks and burdens of CPR. Patient has elected to have attempts at resuscitation; in the event that his heart and breathing were to stop.  He was encouraged to talk to his sister to ensure she fully knows and understands his goals of care wishes. At this time, patient will remain a FULL CODE WITH FULL INTERVENTIONS. Length of Voluntary ACP Conversation in minutes:  21          Teresa Serrano., MSW  Palliative Care   #[phone]

## 2022-05-25 NOTE — CONSULTS
[facility]: [phone]  [facility]: [phone]     Patient Name: Jason Donis  YOB: 1945    Date of Initial Consult : 5/25/2022  Reason for Consult: Care decisions  Requesting Provider: Dr. Alina Chan  Primary Care Physician: Leavy Severs, NP      SUMMARY:   Jason Donis is a 64 y.o. male with a past history of CKd stage 3, HTN, gout and chronic low back pain, who was admitted on 5/24/2022 from home with a diagnosis of syncope. Current medical issues leading to Palliative Medicine involvement include: elderly, cachectic 64year old gentleman admitted with recurrent syncopal episodes. Patient has loss of appetite and unexplained weight loss over the last couple of months. Palliative medicine is consulted for care decisons. CHIEF COMPLAINT: syncope    HPI/SUBJECTIVE:    Past history as above. Mr. Toni Fox is an elderly, cachectic 64year old gentleman admitted after having recurrent syncopal episodes. He reports \"blacking out\" when his brother-in-law was assisting him out of the bathtub at home. Patient reports losing his appetite about 2 months ago and unexplained weight loss of about 59 pounds in that same period. Denies chest pain, SOB or dizziness. The patient is:   [x] Verbal and participatory  [] Non-participatory due to:     GOALS OF CARE: Full code, full interventions  Patient/Health Care Proxy Stated Goals: Prolong life      TREATMENT PREFERENCES:   Code Status: Full Code         PALLIATIVE DIAGNOSES:   1. Encounter for palliative care/goals of care  2. Syncope  3. Severe protein-calorie malnutrition  4. Debility       PLAN:   1. Encounter for palliative care/goals of care - seen at bedside along with Mr. Jerry, MSW. Mr. Toni Fox is lying in bed, awake, alert and oriented x4. Appears weak and cachectic.   Able to share that he came to the hospital after \"blacking out\" at home when his brother-in-law was assisting him out of the bathtub. Patient reports that about 2 months ago his \"appetite just went away\" and that he has lost 59 pounds in the last 2 months. Denies pain or shortness of breath. Reports that he is retired from [employer] as a  for 30 years. Patient is not  and has no children. Patient has 4 living siblings, 2 brothers and 2 sisters. He lives with his youngest sister, Janelle Cardoso and her . Patient states that Ha Hong takes care of all of his business affairs and that there is POA paperwork naming Ha Hong as his MPOA. With patient's permission, attempted to call Janelle Cardoso to request copy of MPOA paperwork. Left voicemail requesting a return call. Introduced and discussed goals of care including benefits and burdens of resuscitation, intubation and ventilation. Patient stated \"Alecia knows what to do\". When asked if he would want to undergo CPR including chest compressions, shock, ACLS medications, intubation and mechanical ventilation, patient stated that he would be accepting of these efforts. Patient reports having had multiple tests earlier today and he is waiting to get results of these tests. At this time, patient remains a full code with full interventions. Awaiting call back from patient's stated Lou Holbrook, regarding MPOA paperwork. 2. Syncope - primary team managing, cardiology consulted and following, echo pending, cardiac monitoring, orthostatic blood pressure check  3. Severe protein-calorie malnutrition - BMI 18.40, nutrition consult, nutritional supplements  4. Debility - PPS 48, lives with his sister and her , reportedly independent with ADLs at home and uses a cane for ambulation assistance. Patient did report needing a walker and per chart review, patient was being assisted by his brother-in-law for some personal care when he \"blacked out\".  Patient reports 59 pound weight loss in last 2 months, states he lost his appetite 2 months ago, consumes ~3 nutritional supplements per day at home,  PT/OT consult  5. Initial consult note routed to primary continuity provider  6. Communicated plan of care with: Palliative IDT      Advance Care Planning:  [] [facility] Interdisciplinary Team has updated the ACP Navigator with [address] and Patient Capacity    Primary Decision [facility] ([address]): Bren Gordon (sister)  [phone]              As far as possible, the palliative care team has discussed with patient / health care proxy about goals of care / treatment preferences for patient. HISTORY:     History obtained from: Chart and patient    Principal Problem:    Recurrent syncope (5/24/2022)    Active Problems:    Weight loss (5/25/2022)      Debility (5/25/2022)      Hypertension (5/25/2022)      Stage 3b chronic kidney disease ([address] (5/25/2022)      Severe protein-calorie malnutrition Dulcy Chele: less than 60% of standard weight) ([facility] [address] (5/25/2022)      Past Medical History:   Diagnosis Date    Chronic kidney disease     stage 3 in 2011    CKD (chronic kidney disease)     ED (erectile dysfunction)     Gout     HTN (hypertension)     Mixed hyperlipidemia       Past Surgical History:   Procedure Laterality Date    HX COLONOSCOPY      HX ENDOSCOPY        No family history on file. History reviewed, no pertinent family history.   Social History     Tobacco Use    Smoking status: Former Smoker    Smokeless tobacco: Never Used   Substance Use Topics    Alcohol use: No     Alcohol/week: 0.0 standard drinks     No Known Allergies   Current Facility-Administered Medications   Medication Dose Route Frequency    cholecalciferol (VITAMIN D3) (1000 Units /25 mcg) tablet 1,000 Units  1,000 Units Oral DAILY    omega-3 acid ethyl esters (LOVAZA) capsule 2,000 mg  2 g Oral DAILY WITH BREAKFAST    tamsulosin (FLOMAX) capsule 0.4 mg  0.4 mg Oral ACD    famotidine (PEPCID) tablet 20 mg  20 mg Oral DAILY    sodium chloride (NS) flush 5-40 mL  5-40 mL IntraVENous Q8H    sodium chloride (NS) flush 5-40 mL  5-40 mL IntraVENous PRN    acetaminophen (TYLENOL) tablet 650 mg  650 mg Oral Q4H PRN    oxyCODONE IR (ROXICODONE) tablet 5 mg  5 mg Oral Q4H PRN    naloxone (NARCAN) injection 0.4 mg  0.4 mg IntraVENous PRN    bisacodyL (DULCOLAX) tablet 10 mg  10 mg Oral DAILY PRN    heparin (porcine) injection 5,000 Units  5,000 Units SubCUTAneous Q8H          Clinical Pain Assessment (nonverbal scale for nonverbal patients): Clinical Pain Assessment  Severity: 0          Duration: for how long has pt been experiencing pain (e.g., 2 days, 1 month, years)  Frequency: how often pain is an issue (e.g., several times per day, once every few days, constant)     FUNCTIONAL ASSESSMENT:     Palliative Performance Scale (PPS):  PPS: 50    ECOG  ECOG Status : Ambulatory, but unable to carry out work activities     PSYCHOSOCIAL/SPIRITUAL SCREENING:      Any spiritual / Congregational concerns:  [] Yes /  [x] No    Caregiver Burnout:  [] Yes /  [] No /  [x] No Caregiver Present      Anticipatory grief assessment:   [x] Normal  / [] Maladaptive        REVIEW OF SYSTEMS:     Systems: constitutional, ears/nose/mouth/throat, respiratory, gastrointestinal, genitourinary, musculoskeletal, integumentary, neurologic, psychiatric, endocrine. Positive findings noted below. Modified ESAS Completed by: provider   Fatigue: 2 Drowsiness: 0     Pain: 0   Anxiety: 0 Nausea: 0   Anorexia: 5 Dyspnea: 0           Stool Occurrence(s): 0   Positive and pertinent negative findings in ROS are noted above in HPI. The following systems were [x] reviewed / [] unable to be reviewed as noted in HPI  Other findings are noted below.      PHYSICAL EXAM:     Constitutional: lying in bed, weak appearing, cachectic, awake, alert and oriented x4, poor appetite  Eyes: pupils equal, anicteric  ENMT: no nasal discharge, moist mucous membranes  Cardiovascular: regular rhythm, distal pulses intact  Respiratory: breathing not labored, symmetric  Gastrointestinal: soft   Last bowel movement: none recorded  Musculoskeletal: no deformity, no tenderness to palpation  Skin: warm, dry  Neurologic: following commands, moving all extremities  Psychiatric: full affect, no hallucinations    Other: Wt Readings from Last 3 Encounters:   05/25/22 54.9 kg (121 lb)   05/19/22 51.7 kg (114 lb)   10/20/20 71.9 kg (158 lb 9.6 oz)     Blood pressure (!) 140/87, pulse 90, temperature 98.2 °F (36.8 °C), resp. rate 18, height 5' 8\" (1.727 m), weight 54.9 kg (121 lb), SpO2 98 %. Pain:  Pain Scale 1: Numeric (0 - 10)  Pain Intensity 1: 0                      LAB AND IMAGING FINDINGS:     Lab Results   Component Value Date/Time    WBC 12.1 05/24/2022 10:55 AM    HGB 10.2 (L) 05/24/2022 10:55 AM    PLATELET 789 27/54/4825 10:55 AM     Lab Results   Component Value Date/Time    Sodium 139 05/25/2022 03:50 AM    Potassium 4.7 05/25/2022 03:50 AM    Chloride 108 05/25/2022 03:50 AM    CO2 22 05/25/2022 03:50 AM    BUN 50 (H) 05/25/2022 03:50 AM    Creatinine 2.33 (H) 05/25/2022 03:50 AM    Calcium 9.3 05/25/2022 03:50 AM    Magnesium 2.8 (H) 05/25/2022 03:50 AM      Lab Results   Component Value Date/Time    Alk. phosphatase 570 (H) 05/24/2022 10:55 AM    Protein, total 7.3 05/24/2022 10:55 AM    Albumin 2.8 (L) 05/24/2022 10:55 AM    Globulin 4.5 (H) 05/24/2022 10:55 AM     No results found for: INR, PTMR, PTP, PT1, PT2, APTT, INREXT, INREXT   No results found for: IRON, FE, TIBC, IBCT, PSAT, FERR   No results found for: PH, PCO2, PO2  No components found for: GLPOC   No results found for: CPK, CKMB           Total time: 30 minutes    > 50% counseling / coordination:  Time spent in direct consultation with the patient, medical team, and family     Prolonged service was provided for  []30 min   []75 min in face to face time in the presence of the patient, spent as noted above.   Time Start:   Time End: Disclaimer: Sections of this note are dictated using utilizing voice recognition software, which may have resulted in some phonetic based errors in grammar and contents. Even though attempts were made to correct all the mistakes, some may have been missed, and remained in the body of the document. If questions arise, please contact our department.

## 2022-05-25 NOTE — PROGRESS NOTES
Pharmacy Dosing Services: Renal Dosing    The following medication: Pepcid was automatically dose-adjusted per THE [facility] P&T Committee Protocol, with respect to renal function. Consult provided for this   64 y.o. , male , for the indication of GERD. Pt Weight:   Wt Readings from Last 1 Encounters:   05/24/22 55 kg (121 lb 4.1 oz)         Previous Regimen Pepcid 20 mg PO BID   Serum Creatinine Lab Results   Component Value Date/Time    Creatinine 2.33 (H) 05/25/2022 03:50 AM       Creatinine Clearance Estimated Creatinine Clearance: 20.3 mL/min (A) (based on SCr of 2.33 mg/dL (H)). BUN Lab Results   Component Value Date/Time    BUN 50 (H) 05/25/2022 03:50 AM       Dosage changed to:  Pepcid 20 mg PO daily    Pharmacy to continue to monitor patient daily. Will make dosage adjustments based upon changing renal function.   Signed Rubi Berger, [address]

## 2022-05-25 NOTE — PROGRESS NOTES
Reason for Admission:   Recurrent syncope [R55]               RUR Score:    16             Resources/supports,as identified by patient/family:   Family support      Barriers to discharge:      None           Current Advanced Directive/Advance Care Plan:   Wants to setup     Healthcare Decision Maker:     Click here to complete [address] including selection of the Healthcare Decision Maker Relationship (ie \"Primary\")                          Plan for utilizing home health:    Family open depends on patient being a surgical candidate                       Likelihood of readmission:   LOW    Transition of Care Plan:             Anticipate HH/Palliative Care       Initial assessment completed with patient sister Mihai Cutler. The patient designates Mihai Cutler to participate in his discharge plan and to receive any needed information. This patient lives in a single family home with sister Ade Wallace. Patient IS able to navigate steps as needed. Prior to hospitalization, patient was considered to be independent with ADLs/IADLS : YES    Patient has a current ACP document on file: Wants to complete CM discussed with floor nurse  The Sister Ade Wallace will be available to transport patient home upon discharge. The patient already has [address] [facility],  medical equipment available in the home. The patient states that he can obtain his medications from the pharmacy, and take his medications as directed. Patient's current insurance is [facility]. PLEASE NOTE--Encounter / Re-Admission Within 30 Days  This patient has had another encounter or admission within the last 30 days.       Readmission Assessment  Number of days since last admission?: 1-7 days  Previous disposition: Home with Family  Who is being interviewed?: Caregiver  What was the patient's/caregiver's perception as to why they think they needed to return back to the hospital?: Other (Comment)  Did you visit your Primary Care Physician after you left the hospital, before you returned this time?: No  Why weren't you able to visit your PCP?: Other (Comment)  Does the patient report anything that got in the way of taking their medications?: No  In our efforts to provide the best possible care to you and others like you, can you think of anything that we could have done to help you after you left the hospital the first time, so that you might not have needed to return so soon?: Additional Community resources available for illness support        Care Management Interventions  Discharge Durable Medical Equipment: Yes (Tanika Blanco)  Physical Therapy Consult: Yes  Support Systems: Other Family Member(s)  Confirm Follow Up Transport: Family

## 2022-05-25 NOTE — PROGRESS NOTES
Discussed with patient and family about collins placement. Patient reports that past collins attempts were unsuccessful and painful. Patient refuses collins placement at this time.

## 2022-05-25 NOTE — PROGRESS NOTES
Problem: Syncope  Goal: *Absence of injury  Outcome: Progressing Towards Goal  Goal: Decrease or eliminate episodes of syncope  Outcome: Progressing Towards Goal     Problem: Patient Education: Go to Patient Education Activity  Goal: Patient/Family Education  Outcome: Progressing Towards Goal     Problem: Falls - Risk of  Goal: *Absence of Falls  Description: Document Enmanuel Ang Fall Risk and appropriate interventions in the flowsheet. Outcome: Progressing Towards Goal  Note: Fall Risk Interventions:  Mobility Interventions: Bed/chair exit alarm         Medication Interventions: Bed/chair exit alarm    Elimination Interventions: Bed/chair exit alarm    History of Falls Interventions: Bed/chair exit alarm         Problem: Patient Education: Go to Patient Education Activity  Goal: Patient/Family Education  Outcome: Progressing Towards Goal     Problem: Pressure Injury - Risk of  Goal: *Prevention of pressure injury  Description: Document Tino Scale and appropriate interventions in the flowsheet.   Outcome: Progressing Towards Goal  Note: Pressure Injury Interventions:  Sensory Interventions: Assess changes in LOC         Activity Interventions: Increase time out of bed    Mobility Interventions: Pressure redistribution bed/mattress (bed type)    Nutrition Interventions: Document food/fluid/supplement intake    Friction and Shear Interventions: Lift sheet                Problem: Patient Education: Go to Patient Education Activity  Goal: Patient/Family Education  Outcome: Progressing Towards Goal

## 2022-05-25 NOTE — PROGRESS NOTES
conducted an initial consultation and Spiritual Assessment for Jorge Lockwood, who is a 64 y. o.,male. Patients Primary Language is: [place]. According to the patients EMR Gnosticist Affiliation is: No Church. The reason the Patient came to the hospital is:   Patient Active Problem List    Diagnosis Date Noted    Weight loss 05/25/2022    Debility 05/25/2022    Hypertension 05/25/2022    Recurrent syncope 05/24/2022    Stricture of urethra 10/20/2020    Microscopic hematuria 06/24/2015        The  provided the following Interventions:  Initiated a relationship of care and support. Listened empathically. Addressed patient's ACP, he stated he has one at home. Provided information about Spiritual Care Services. Offered assurance of prayer on patient's behalf. Chart reviewed. The following outcomes where achieved:   confirmed Patient's Gnosticist Affiliation. Patient stated he does not attend Jain. Provided devotional material and greeting card. Patient expressed gratitude for 's visit. Assessment:  Patient does not have any Caodaism/cultural needs that will affect patients preferences in health care. Plan:  Chaplains will continue to follow and will provide pastoral care on an as needed/requested basis.  recommends bedside caregivers page  on duty if patient shows signs of acute spiritual or emotional distress. Rev.  Sarabjit Maria, Certified Respecting [address] [place] [address]  [phone]

## 2022-05-25 NOTE — PROGRESS NOTES
Pt refused PT due to:  []  Nausea/vomiting  []  Eating  []  Pain  []  Pt lethargic  [x]  Off Unit: pt with transport preparing to leave floor for CT  Will f/u later as schedule allows. Thank you.   Lisa Rogers

## 2022-05-26 PROBLEM — I50.22 SYSTOLIC CHF, CHRONIC (HCC): Status: ACTIVE | Noted: 2022-01-01

## 2022-05-26 PROBLEM — C78.7 CARCINOMA OF PANCREAS METASTATIC TO LIVER (HCC): Status: ACTIVE | Noted: 2022-01-01

## 2022-05-26 PROBLEM — C25.9 CARCINOMA OF PANCREAS METASTATIC TO LIVER (HCC): Status: ACTIVE | Noted: 2022-01-01

## 2022-05-26 PROBLEM — C80.0 CARCINOMATOSIS (HCC): Status: ACTIVE | Noted: 2022-01-01

## 2022-05-26 NOTE — ACP (ADVANCE CARE PLANNING)
Advance Care Planning     General Advance Care Planning (ACP) Conversation      Date of Conversation: 5/26/2022    Conducted with: Patient, his sister Vera Fleischer), his brother-in-law, Ryan Decker NP (Palliative) and this writer. Healthcare Decision Maker:     Patient did not have a formal advance medical directive (AMD) or a POA; that he has already completed. A new AMD was completed, naming patient's sister Vera Fleischer, #[phone]) as his primary decision maker. Palliative Medicine Advance Medical Directive      Pt did not have an Advance Medical Directive (AMD) on file in EMR. A new AMD was completed, with patient. Primary Decision Maker: Vera Fleischer   Relationship to patient: Sister  Phone number: [phone]  [x] Named in a newly scanned document   [] Legal Next of Kin  [] Guardian    ACP documents you currently have include:  [x] New Advance Medical Directive or Living Will  [] Durable Do Not Resuscitate  [] Physician Orders for Scope of Treatment (POST)  [] Medical Power of   [] Other        [facility] [address] [place] [address] (Physician Orders for Scope of Treatment)       Date of conversation: 5/26/2022  Location: [facility]                                Length (minutes): 20    Participants:     [x] Patient      [x] Healthcare agent (already designated in existing ACP document)      Name: Vera Fleischer    Relationship to Patient:     Phone number: [phone]                   Other persons present:                  Name: Patient's brother-in-law                           Name:  Ryan Decker NP (Palliative)                           Name: Laura Preciado. [facility]. MSW (Palliative)         Advanced Zac Hammer ACP Facilitator: Laura Preciado.  [facility] MSW      Conversation Topics   (If Patient does not have decision making capacity, Agent/Surrogate responds based on understanding of how patient would respond if capable)      Understanding of Medical Condition(s) AND Potential Complications:    Patient response: Patient has understanding of his medical condition(s) and the potential complications. Patient has been given the detailed medical updates and the fact that the medical team is now recommending comfort measures and home with hospice services. Healthcare Agent/Other Surrogate: Patient's sister Melina Crespo) and her  have full understanding of patient's current medical condition(s) and the potential complications and prognosis. Patient's sister and brother-in-law have been given regular medical updates and they are fully aware of his current diagnosis and prognosis. They are also aware that the medical team is recommending comfort measures and home with hospice. Sister and brother-in-law are on-board with that plan, for patient. Cardiopulmonary Resuscitation      \"What do you understand about CPR? \" Response: Patient and his family have been educated on the risks and burdens of CPR. Patient and his family are in agreement with patient not being resuscitated, in the event his heart and breathing were to stop. They are also on-board with patient transitioning to comfort measures, at the time of discharge and home with hospice services. A hospice consult has already been placed. A POST form was completed with patient and his family was present. The original and a copy were given to the sister. The copy of the POST will be placed on the chart, upon discharge. At this time, patient is a DNR/DNI with a transition to comfort measures upon discharge and NO feeding tubes.     Order Elected for CPR:  []  Attempt Resuscitation [x]  Do Not Attempt Resuscitation      When NOT in Cardiopulmonary Arrest, Order Elected:      [x] Comfort Measures upon discharge  [] Limited Additional Interventions  [] Full Interventions    Artificially Administered Nutrition, Order Elected:    [x] No Feeding Tube   [] Feeding Tube for a defined trial period  [] Feeding Tube long-term if indicated      The following was provided (check all that apply):      Healthcare Decision Information Cards:   [] Help with Breathing Facts   [] Tube Feeding Facts   [] CPR Facts               [] Review of existing Advance Directive              [x] Assistance with Completion of New Advance Medical Directive and a copy was placed on the chart to be scanned in to the EMR. [x] Review of [place] POST Form         Meeting Outcomes:     [x] ACP discussion completed   [x] [place] form completed              [x] [place] prepared for Provider review and signature   [x] Original placed on Chart, if in facility (form to be sent with patient at discharge)              [x] Copy given to healthcare agent    [] Copy scanned to electronic medical record         Follow-up plan:       [] Schedule follow-up conversation to continue planning   [] Referred individual to Provider for additional questions/concerns               [x] Advised patient/agent/surrogate to review completed POST form and update if needed with changes in condition, patient preferences or care setting       Recommendations: The Palliative Care team will continue to offer comfort and support, to patient, while he remains hospitalized. [x] This note routed to one or more involved healthcare providers          Teresa Fry [facility]  Palliative Medicine Inpatient   [phone]

## 2022-05-26 NOTE — PROGRESS NOTES
Problem: Syncope  Goal: *Absence of injury  Outcome: Progressing Towards Goal  Goal: Decrease or eliminate episodes of syncope  Outcome: Progressing Towards Goal     Problem: Patient Education: Go to Patient Education Activity  Goal: Patient/Family Education  Outcome: Progressing Towards Goal     Problem: Falls - Risk of  Goal: *Absence of Falls  Description: Document Isaac Blancas Fall Risk and appropriate interventions in the flowsheet.   Outcome: Progressing Towards Goal  Note: Fall Risk Interventions:  Mobility Interventions: Assess mobility with egress test,Bed/chair exit alarm,Communicate number of staff needed for ambulation/transfer,OT consult for ADLs,Patient to call before getting OOB,PT Consult for mobility concerns,PT Consult for assist device competence,Strengthening exercises (ROM-active/passive),Utilize walker, cane, or other assistive device         Medication Interventions: Bed/chair exit alarm,Evaluate medications/consider consulting pharmacy,Patient to call before getting OOB,Teach patient to arise slowly    Elimination Interventions: Bed/chair exit alarm,Call light in reach,Patient to call for help with toileting needs,Stay With Me (per policy),Toilet paper/wipes in reach,Toileting schedule/hourly rounds,Urinal in reach    History of Falls Interventions: Bed/chair exit alarm,Consult care management for discharge planning,Door open when patient unattended,Evaluate medications/consider consulting pharmacy,Investigate reason for fall,Room close to nurse's station         Problem: Patient Education: Go to Patient Education Activity  Goal: Patient/Family Education  Outcome: Progressing Towards Goal

## 2022-05-26 NOTE — CONSULTS
Phone: [phone]  Paging : [phone]     Hematology/Oncology Consult Note    Patient: Mian Rojas MRN: [id number]  CSN: [id number]    YOB: 1945  Age: 64 y.o.   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 [facility]) ICD-10-CM: C25.9, C78.7  ICD-9-CM: 157.9, 197.7  5/26/2022 Yes        Carcinomatosis ([facility] ICD-10-CM: C80.0  ICD-9-CM: 199.0  5/26/2022 Yes        Systolic CHF, chronic ([facility] ICD-10-CM: I50.22  ICD-9-CM: 428.22, 428.0  5/26/2022 Yes        Weight loss ICD-10-CM: R63.4  ICD-9-CM: 783.21  5/25/2022 Unknown        Debility ICD-10-CM: R53.81  ICD-9-CM: 799.3  5/25/2022 Unknown        Hypertension ICD-10-CM: I10  ICD-9-CM: 401.9  5/25/2022 Unknown        Stage 3b chronic kidney disease ([facility] ICD-10-CM: N18.32  ICD-9-CM: 585.3  5/25/2022 Unknown        Severe protein-calorie malnutrition Dougie Hinders: less than 60% of standard weight) ([facility] ICD-10-CM: E43  ICD-9-CM: 063  5/25/2022 Yes        Encounter for palliative care ICD-10-CM: Z51.5  ICD-9-CM: V66.7  Unknown Unknown        * (Principal) Recurrent syncope ICD-10-CM: R55  ICD-9-CM: 780.2  5/24/2022 Unknown                RECOMMENDATIONS:   · Patient is very weak, PS 4, not a candidate for oncologic treatment  · Survival is limited, a couple of to a few weeks  · Noted plan for hospice which I concur  · I discussed with patient and his sister- medical POA both agreed with hospice  · DNR/DNI per their request  · We will sign off        HPI:     Kaylynmorgan Rojas is a 64 y.o., BLACK/, male, who I have been asked to see for stage 4 pancreatic cancer. He has no wife or children. Was admitted with syncope. He apparently also has had significant weight loss and anorexia. Work up including CT showed large pancreatic tail mass and extensive abdominal malignancy, peritoneal carcinomatosis, small volume ascites, extensive liver metastatic disease. We are consulted for management. He has been very weak. Not able to ambulate. Poor appetite and has lost weight. He and his sister had already made decision about hospice. Past Medical History:   Diagnosis Date    Chronic kidney disease     stage 3 in 2011    CKD (chronic kidney disease)     ED (erectile dysfunction)     Gout     HTN (hypertension)     Mixed hyperlipidemia        Past Surgical History:   Procedure Laterality Date    HX COLONOSCOPY      HX ENDOSCOPY         No family history on file.     Social History     Socioeconomic History    Marital status: SINGLE   Tobacco Use    Smoking status: Former Smoker    Smokeless tobacco: Never Used   Substance and Sexual Activity    Alcohol use: No     Alcohol/week: 0.0 standard drinks    Drug use: No       Current Facility-Administered Medications   Medication Dose Route Frequency    tamsulosin (FLOMAX) capsule 0.4 mg  0.4 mg Oral ACD    dextrose 5 % - 0.45% NaCl infusion  25 mL/hr IntraVENous CONTINUOUS    famotidine (PF) (PEPCID) 20 mg in 0.9% sodium chloride 10 mL injection  20 mg IntraVENous Q12H    ondansetron (ZOFRAN) injection 4 mg  4 mg IntraVENous Q6H PRN    sodium chloride (NS) flush 5-40 mL  5-40 mL IntraVENous Q8H    sodium chloride (NS) flush 5-40 mL  5-40 mL IntraVENous PRN    acetaminophen (TYLENOL) tablet 650 mg  650 mg Oral Q4H PRN    oxyCODONE IR (ROXICODONE) tablet 5 mg  5 mg Oral Q4H PRN    naloxone (NARCAN) injection 0.4 mg  0.4 mg IntraVENous PRN    bisacodyL (DULCOLAX) tablet 10 mg  10 mg Oral DAILY PRN    heparin (porcine) injection 5,000 Units  5,000 Units SubCUTAneous Q8H       Prior to Admission medications    Medication Sig Start Date End Date Taking? Authorizing Provider   methocarbamoL (ROBAXIN) 750 mg tablet Take 1 Tablet by mouth four (4) times daily. 5/19/22   Roselyn Harris MD   levoFLOXacin (Levaquin) 500 mg tablet Take 1 Tablet by mouth daily. 5/19/22   Roselyn Harris MD   amLODIPine (NORVASC) 5 mg tablet Take 5 mg by mouth daily. Provider, Historical   tiZANidine (ZANAFLEX) 4 mg tablet Take 4 mg by mouth every six (6) hours as needed for Muscle Spasm(s). Provider, Historical   MULTIVITAMINS WITH FLUORIDE (MULTI-VITAMIN PO) Take  by mouth. Provider, Historical   Cholecalciferol, Vitamin D3, (VITAMIN D3) 1,000 unit cap Take  by mouth. Provider, Historical   vardenafil (LEVITRA) 10 mg tablet Take 10 mg by mouth as needed. Provider, Historical   omega-3 acid ethyl esters (LOVAZA) 1 gram capsule Take 2 g by mouth daily (with breakfast). Provider, Historical   colchicine 0.6 mg tablet Take 0.6 mg by mouth daily as needed. Provider, Historical   tamsulosin (FLOMAX) 0.4 mg capsule Take 0.4 mg by mouth Daily (before dinner). Provider, Historical   simvastatin (ZOCOR) 10 mg tablet Take  by mouth nightly. Provider, Historical       No Known Allergies      ROS:     CONSTITUTION: weak anorexia, weight loss as in HPI  HEENT: No visual changes. No change in hearing acuity, tinnitus, hoarseness, sore throat, or postnasal drip. CARDIOVASCULAR: No chest pain, palpitations, or syncope. No extremity edema. RESPIRATORY: No shortness of breath, cough, wheezing, or hemoptysis. GASTROINTESINAL: As in HPI   GENITOURINARY: no complaints. NEUROLOGICALl: No diplopia, dysarthria, weakness, or headaches. No seizures, focal weakness, or abnormal gait. PSYCHIATRIC: No anxiety, depression, or memory loss. HEMATOLOGIC: No easy bruising. No unusual bleeding. No enlarged lymph nodes. MUSCULOSKELETAL: No unusual joint or muscle ache. SKIN: No rash or pruritus.     Physical Exam: VITAL SIGNS:   Patient Vitals for the past 24 hrs:   BP Temp Pulse Resp SpO2   05/26/22 1540 (!) 108/53 97.7 °F (36.5 °C) 89 18 100 %   05/26/22 0732 132/79 98.1 °F (36.7 °C) 95 18 100 %   05/26/22 0449 (!) 104/57 98.4 °F (36.9 °C) 84 18 100 %   05/26/22 0025 (!) 140/84 97.4 °F (36.3 °C) 70 18 100 %   05/25/22 2007 135/81 97.5 °F (36.4 °C) 93 18 98 %   05/25/22 1715 136/74 98 °F (36.7 °C) 71 18 100 %     GENERAL: cachectic. HEENT: conjunctivae normal, eyelids normal, PERRLA, anicteric, external ears and nose normal, hearing grossly normal.   NECK: supple, no masses. LUNG: breathing comfortably, lungs clear to auscultation and percussion. CARDIOVASCULAR: normal heart sounds, heart rhythm regular, no peripheral edema. ABDOMEN: mildly distended, no pain  PELVIS: pelvic exam deferred. RECTUM: rectal exam deferred. LYMPH NODES: no cervical adenopathy, no axillary adenopathy, no supraclavicular adenopathy, no infraclavicular adenopathy. SKIN: no rash, no petechiae, no ecchymosis, no pallor, no cutaneous nodules. MUSCULOSKELETAL: normal muscle strength. NEUROLOGIC: no focal motor deficit, normal gait, no abnormal mental status. PSYCHIATRIC: oriented to person, time and place, mood and affect appropriate to situation.       Ancillary Studies:     Bloodwork:  Recent Results (from the past 24 hour(s))   METABOLIC PANEL, BASIC    Collection Time: 05/26/22  3:25 AM   Result Value Ref Range    Sodium 138 136 - 145 mmol/L    Potassium 4.3 3.5 - 5.5 mmol/L    Chloride 109 100 - 111 mmol/L    CO2 21 21 - 32 mmol/L    Anion gap 8 3.0 - 18 mmol/L    Glucose 112 (H) 74 - 99 mg/dL    BUN 55 (H) 7.0 - 18 MG/DL    Creatinine 2.27 (H) 0.6 - 1.3 MG/DL    BUN/Creatinine ratio 24 (H) 12 - 20      GFR est AA 34 (L) >60 ml/min/1.73m2    GFR est non-AA 28 (L) >60 ml/min/1.73m2    Calcium 8.8 8.5 - 10.1 MG/DL       Radiology:   XR SPINE LUMB MIN 4 V    Result Date: 5/19/2022  EXAM: XR SPINE LUMB MIN 4 V INDICATION: Low back pain COMPARISON: None. FINDINGS: 5 separate views of the lumbar spine. There is grade 1 anterolisthesis of L4 on L5. Vertebral body heights are normal. Mild diffuse intervertebral disc height loss, greatest L4-S1. Lower lumbar facet arthropathy. No pars interarticularis defect. 1. Grade 1 anterolisthesis L4 and L5 with lower lumbar predominant degenerative findings. No acute radiographic abnormality. XR ABD (KUB)    Result Date: 5/26/2022  EXAM: XR ABD (KUB) CLINICAL INDICATION/HISTORY: eval for SBO -Additional: None COMPARISON: CT performed 5/25/2022 TECHNIQUE: Portable frontal view of the chest abdomen _______________ FINDINGS: Nonobstructive/nonspecific bowel gas pattern. No gross free intraperitoneal gas. Large burden of formed stool throughout the large intestine. Clear lung bases. No acute osseous abnormality. Advanced right hip joint arthrosis. _______________     Nonobstructive/nonspecific bowel gas pattern without acute radiographic abnormality. CT HEAD WO CONT    Result Date: 5/25/2022  EXAM: CT of the brain without intravenous contrast. INDICATION:  \"syncope. \" COMPARISON:  No relevant prior imaging is available for comparison at the time of dictation. DOSE REDUCTION AND DICOM INFORMATION: One or more dose reduction techniques were used on this CT: automated exposure control, adjustment of the mAs and/or kVp according to patient size, and iterative reconstruction techniques. The specific techniques used on this CT exam have been documented in the patient's electronic medical record. Digital Imaging and Communications in Medicine (DICOM) format image data are available to nonaffiliated external healthcare facilities or entities on a secure, media free, reciprocally searchable basis with patient authorization for at least a 12-month period after this study. _______________ FINDINGS:      IMAGE QUALITY:  Diagnostic.       BRAIN AND EXTRA-AXIAL SPACE:            SUBACUTE TERRITORIAL TRANSCORTICAL INFARCT:  None detected. MASS:  None detected. HEMORRHAGE:  None. SUBDURAL FLUID COLLECTION:  None detected. HYDROCEPHALUS:  None. REMOTE  TERRITORIAL CEREBRAL INFARCT:  None detected. REMOTE CEREBELLAR INFARCT:  Small focus in the medial midportion of the left cerebellar hemisphere. STRIVE (STandards for Reporting Vascular changes on nEuroimaging):                            --[place] of white matter hypodensity (\"leukoaraiosis\") of presumed vascular origin:  None significant. --[place] of lacunes of presumed vascular origin (often undetectable on CT): Solitary focus in the right caudate head. --Degree of brain atrophy:  Moderate. BURDEN OF CALCIFIC INTRACRANIAL ATHEROSCLEROSIS:   None significant. HEENT:            INCLUDED UPPER ORBITS:  Unremarkable. INCLUDED UPPER PARANASAL SINUSES:  Predominantly clear. MASTOID AIR CELLS:  Predominantly clear. BONES:  Unremarkable. SUPERFICIAL SOFT TISSUES: Unremarkable. _______________     No acute findings. 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 _______________     CT ABD PELV WO CONT    Result Date: 5/25/2022  EXAM: CT of the abdomen and pelvis without intravenous contrast. INDICATION:  \"weight loss, cachexia, vomiting. \" COMPARISON:  None available. DOSE REDUCTION AND DICOM INFORMATION: One or more dose reduction techniques were used on this CT: automated exposure control, adjustment of the mAs and/or kVp according to patient size, and iterative reconstruction techniques. The specific techniques used on this CT exam have been documented in the patient's electronic medical record.   Digital Imaging and Communications in Medicine (DICOM) format image data are available to nonaffiliated external healthcare facilities or entities on a secure, media free, reciprocally searchable basis with patient authorization for at least a 12-month period after this study. _______________ FINDINGS:      IMAGE QUALITY:  Imaging of the solid organs, vasculature, and the bowel is intrinsically limited by noncontrast technique. CHEST BASE: Tiny pericardial effusion. LIVER: Multiple scattered patchy low attenuation lesions indicative of metastatic disease. GALLBLADDER: Unremarkable. BILE DUCTS: Unremarkable. PANCREAS: 7.1 x 4.8 cm large infiltrative mass in the tail. SPLEEN: Unremarkable. GASTROINTESTINAL TRACT AND PERITONEAL CAVITY: Scattered nodularity and feathery soft tissue attenuation within the peritoneal cavity of peritoneal carcinomatosis. Amorphous masslike attenuation in the right lower quadrant adjacent to the terminal ileum on axial example image 76, incompletely characterized, suspected malignant involvement of the adjacent ileum and cecum. An infectious/inflammatory process is also possible. There is fecalization of the upstream ileal contents indicative of stasis and partial obstruction, however, there is no diffuse small bowel dilatation of high-grade mechanical small bowel obstruction. Lower esophagus:  Unremarkable. Hiatal hernia:  None significant. Diverticulosis:  Marked in the sigmoid colon. Ascites:  Minimal.           Pneumoperitoneum:  No.           Appendix/region:  Unremarkable. ADRENALS:           Right:  Unremarkable. Left:  Unremarkable. KIDNEYS, URETERS, BLADDER:           Nonobstructive calculi:  None detected. Right kidney/ureter:  Unremarkable. Left kidney/ureter:  Unremarkable. Bladder:  Patulous, potentially chronic outlet obstruction. VASCULATURE: Unremarkable. LYMPH NODES: Unremarkable. REPRODUCTIVE ORGANS: Unremarkable. SUPERFICIAL SOFT TISSUES: Unremarkable.       BONES:  Advanced lower lumbar degenerative facet arthropathy. No aggressive-appearing bone lesions are detected. _______________     Limited noncontrast exam showing extensive abdominal malignancy. Large pancreatic tail mass, peritoneal carcinomatosis, small volume ascites, extensive liver metastatic disease. Amorphous incompletely characterized masslike process involving the terminal ileum with extensive surrounding stranding -- this could reflect direct neoplastic involvement or alternatively be infectious/inflammatory (or a combination). _______________     XR CHEST PORT    Result Date: 5/24/2022  EXAM: One-view chest CLINICAL HISTORY: weakness, syncope , COMPARISON: None FINDINGS: Frontal view of the chest demonstrate clear lungs. Cardiac silhouette is normal in size and contour. No acute bony or soft tissue abnormality. No acute pulmonary process identified. ECHO ADULT COMPLETE    Result Date: 5/25/2022    Technical qualifiers: Echo study was technically difficult due to patient's body habitus.   Left Ventricle: Mildly reduced left ventricular systolic function with a visually estimated EF of 45 - 50%. Left ventricle size is normal. Normal wall thickness. Mild global hypokinesis present. Grade I diastolic dysfunction with normal LAP.   Right Ventricle: Mildly reduced systolic function. TAPSE is 1.2 cm.   Pulmonary Arteries: Mild pulmonary hypertension present. The estimated PASP is 36 mmHg.   Pericardium: Trivial localized pericardial effusion present around the right ventricle. No indication of cardiac tamponade. Thanks for the consult. We will follow! Adela Tabares.  Joselyn Ocampo MD, PhD, [place]  Phone: [phone]  Pager: [phone]

## 2022-05-26 NOTE — PROGRESS NOTES
Physician Progress Note      Tammie Montgomery  [facility] #:                  [id number]  :                       1945  ADMIT DATE:       2022 10:39 AM  DISCH DATE:  RESPONDING  PROVIDER #:        Trace Lemus MD          QUERY TEXT:    Pt admitted with syncope  Noted documentation of  severe malnutrition on 22 by ordered RD consultant. If possible, please document in progress notes and discharge summary:    The medical record reflects the following:    Risk Factors: Advanced age, HTN, chronic low back pain, CKD stage 3, Admitted for syncope, weight lost, weakness and debility    Clinical Indicators:  Per RD  Malnutrition Status:  Severe malnutrition (22 1249)  Context:  Chronic illness  Findings of the 6 clinical characteristics of malnutrition:  Energy Intake:  Unable to assess  Weight Loss:  Greater than 5% over 1 month  Body Fat Loss:  Severe body fat loss, Triceps,Orbital,Buccal region  Muscle Mass Loss:  Severe muscle mass loss, Calf (gastrocnemius),Clavicles (pectoralis &deltoids),Thigh (quadriceps),Temples (temporalis),Hand (interosseous)  BMI: 18.40 kg/m? Current Body Wt:  55 kg (121 lb 4.1 oz), 78.7 % IBW.  Bed scale  Usual Body Weight: 59.9 kg (132 lb) (2022)  Nutrition Diagnosis: Severe malnutrition related to inadequate protein-energy intake as evidenced by poor intake prior to admission, weight loss greater than or equal to 5% in 1 month, severe loss of subcutaneous fat, severe muscle loss    Treatment: Receiving RD consult, Continue current diet (adult diet: clear liquid), Continue oral nutrition supplement (Ensure)    Thank you,  Sha Leroy RN, BSN, CRCR  [email or web address]  Options provided:  -- Severe malnutrition  confirmed  -- Other - I will add my own diagnosis  -- Disagree - Not applicable / Not valid  -- Disagree - Clinically unable to determine / Unknown  -- Refer to Clinical Documentation Reviewer    PROVIDER RESPONSE TEXT:    The diagnosis of severe malnutrition was confirmed.     Query created by: Janeth Phoenix on 5/26/2022 9:24 AM      Electronically signed by:  Romy Simpson MD 5/26/2022 10:47 AM

## 2022-05-26 NOTE — PROGRESS NOTES
Problem: Mobility Impaired (Adult and Pediatric)  Goal: *Acute Goals and Plan of Care (Insert Text)  Description: Physical Therapy Goals  Initiated 5/25/2022 and to be accomplished within 7 day(s)  1. Patient will move from supine to sit and sit to supine  in bed with supervision/set-up. 2.  Patient will transfer from bed to chair and chair to bed with supervision/set-up using the least restrictive device. 3.  Patient will perform sit to stand with supervision/set-up. 4.  Patient will ambulate with supervision/set-up for 100 feet with the least restrictive device. Outcome: Progressing Towards Goal   PHYSICAL THERAPY TREATMENT    Patient: Flora Hunt (64 y.o. male)  Date: 5/26/2022  Diagnosis: Recurrent syncope [R55] Recurrent syncope    Precautions: Fall  PLOF: ambulatory with a cane or furniture walking    ASSESSMENT:  Pt in bed upon arrival.  Sat up EOB without assist, used urinal, 100cc. Increased time needed for pt to perform sit to stand due to being tired. Performed side steps with RW and Marilin. Poor posture, no LOB. Returned to supine without assist.  Progression toward goals:   []      Improving appropriately and progressing toward goals  [x]      Improving slowly and progressing toward goals  []      Not making progress toward goals and plan of care will be adjusted     PLAN:  Patient continues to benefit from skilled intervention to address the above impairments. Continue treatment per established plan of care. Discharge Recommendations:  Gen Umanzor  Further Equipment Recommendations for Discharge:  rolling walker     SUBJECTIVE:   Patient stated I am so tired.     OBJECTIVE DATA SUMMARY:   Critical Behavior:  Neurologic State: Alert  Orientation Level: Oriented X4  Cognition: Appropriate decision making,Appropriate for age attention/concentration,Appropriate safety awareness,Follows commands  Safety/Judgement: Fall prevention  Functional Mobility Training:  Bed Mobility:    Supine to Sit: Stand-by assistance;Supervision  Sit to Supine: Stand-by assistance;Supervision  Transfers:  Sit to Stand: Contact guard assistance  Stand to Sit: Contact guard assistance  Balance:  Sitting: Intact  Standing: Impaired; With support  Standing - Static: Poor  Standing - Dynamic : Poor   Ambulation/Gait Training:  Distance (ft): 2 Feet (ft)  Assistive Device: Gait belt;Walker, rolling  Gait Abnormalities: Decreased step clearance  Speed/Trinity: Delayed  Step Length: Right shortened;Left shortened        Pain:  Pain level pre-treatment: 0/10  Pain level post-treatment: 0/10   Pain Intervention(s): Medication (see MAR); Rest, Ice, Repositioning   Response to intervention: Nurse notified, See doc flow    Activity Tolerance:   poor  Please refer to the flowsheet for vital signs taken during this treatment. After treatment:   [] Patient left in no apparent distress sitting up in chair  [x] Patient left in no apparent distress in bed  [x] Call bell left within reach  [] Nursing notified  [] Caregiver present  [] Bed alarm activated  [] SCDs applied      COMMUNICATION/EDUCATION:   [x]         Role of Physical Therapy in the acute care setting. [x]         Fall prevention education was provided and the patient/caregiver indicated understanding. [x]         Patient/family have participated as able in working toward goals and plan of care. [x]         Patient/family agree to work toward stated goals and plan of care. []         Patient understands intent and goals of therapy, but is neutral about his/her participation.   []         Patient is unable to participate in stated goals/plan of care: ongoing with therapy staff.  []         Other:        Katelyn Mckoy PTA   Time Calculation: 14 mins

## 2022-05-26 NOTE — PROGRESS NOTES
Hospitalist Progress Note    Patient: Arthur Whitley MRN: [id number]  CSN: [id number]    YOB: 1945  Age: 64 y.o. Sex: male    DOA: 5/24/2022 LOS:  LOS: 2 days          Chief Complaint:    Metastatic cancer      Assessment/Plan     Johnnie novak 64 y. o. male with PMHX of hypertension, chronic low back pain, CKD stage III who presents to the emergency department C/O syncopal episode just prior to arrival.    Admitted for syncope.     New diagnosis : metastatic cancer in abdomen, multiple sites, large pancreatic tail mass, liver mets, carcinomatosis    Progressive cachexia, malnutrition, anorexia prior to admission     Syncope  Due to volume depletion  CT head shows chronic CVA, nil acute    Chronic systolic CHF      Iron deficiency    Cachexia    weakness     Mild anemia     Consult with oncology  Consider distal feeding tube but establish GOC first with this diagnosis, just notified patient and sister yesterday afternoon     Hypertension -  Resume meds when needed     CKD, Stage 3-  No acute issues     KUB    Follow up with palliative care    Recommend hospice consult     Prognosis russ    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 [employer]  Retired 1993  Not , no kids  Loves to go fishing  Sister is best support system    Disposition :  Patient Active Problem List   Diagnosis Code    Microscopic hematuria R31.29    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    Recurrent syncope R55    Weight loss R63.4    Debility R53.81    Hypertension I10    Stage 3b chronic kidney disease ([address] N18.32    Severe protein-calorie malnutrition (Shruthi Jhoan: less than 60% of standard weight) ([address] E43    Encounter for palliative care Z51.5    Carcinoma of pancreas metastatic to liver ([address] C25.9, C78.7    Carcinomatosis ([address] Q85.8    Systolic CHF, chronic (HCC) I50.22       Subjective:    I am weak  My back hurts  No appetite  No diarrhea    Review of systems:    Constitutional: denies fevers, chills, myalgias  Respiratory: denies SOB  Cardiovascular: denies chest pain  Gastrointestinal: denies nausea  Has not eaten      Vital signs/Intake and Output:  Visit Vitals  /79   Pulse 95   Temp 98.1 °F (36.7 °C)   Resp 18   Ht 5' 8\" (1.727 m)   Wt 54.9 kg (121 lb)   SpO2 100%   BMI 18.40 kg/m²     Current Shift:  No intake/output data recorded. Last three shifts:  05/24 1901 - 05/26 0700  In: 1444.2 [P.O.:720; I.V.:724.2]  Out: 1125 [Urine:1125]    Exam:    General: fril thin elderly AAM NAD Ox3  CVS:Regular rate and rhythm, no M/R/G, S1/S2 heard, no thrill  Lungs:Clear to auscultation bilaterally, no wheezes, rhonchi, or rales  Abdomen: Soft, Nontender, ND dec BS  Extremities: No C/C/E, pulses palpable 2+  Neuro:grossly normal , follows commands  Psych:appropriate                Labs: Results:       Chemistry Recent Labs     05/26/22  0325 05/25/22  0350 05/24/22  1055   * 98 115*    139 139   K 4.3 4.7 5.4    108 109   CO2 21 22 20*   BUN 55* 50* 47*   CREA 2.27* 2.33* 2.41*   CA 8.8 9.3 9.1   AGAP 8 9 10   BUCR 24* 21* 20   AP  --   --  570*   TP  --   --  7.3   ALB  --   --  2.8*   GLOB  --   --  4.5*   AGRAT  --   --  0.6*      CBC w/Diff Recent Labs     05/24/22  1055   WBC 12.1   RBC 3.42*   HGB 10.2*   HCT 32.7*      GRANS 60   LYMPH 33   EOS 0      Cardiac Enzymes No results for input(s): CPK, CKND1, GERBER in the last 72 hours. No lab exists for component: CKRMB, TROIP   Coagulation No results for input(s): PTP, INR, APTT, INREXT in the last 72 hours. Lipid Panel No results found for: CHOL, CHOLPOCT, CHOLX, CHLST, CHOLV, 971317, HDL, HDLP, LDL, LDLC, DLDLP, 986053, VLDLC, VLDL, TGLX, TRIGL, TRIGP, TGLPOCT, CHHD, CHHDX   BNP No results for input(s): BNPP in the last 72 hours.    Liver Enzymes Recent Labs     05/24/22  1055   TP 7.3   ALB 2.8*   *      Thyroid Studies Lab Results   Component Value Date/Time    TSH 1.89 05/24/2022 10:55 AM Procedures/imaging: see electronic medical records for all procedures/Xrays and details which were not copied into this note but were reviewed prior to creation of Mana Bustos MD

## 2022-05-26 NOTE — PROGRESS NOTES
Bedside and Verbal shift change report given to JASON Ryan (oncoming nurse) by MIKAEL Jennings, RN (offgoing nurse). Report included the following information SBAR, Kardex, Intake/Output, MAR and Recent Results.

## 2022-05-26 NOTE — PROGRESS NOTES
CM notes that palliative has met with patient and family, [facility] has been consulted and CM notes that family is currently processing the situation. The patient's sister and brother in law are agreeable to take him home on comfort measures with hospice support. CM to meet with family in the morning.

## 2022-05-26 NOTE — PROGRESS NOTES
Problem: Syncope  Goal: *Absence of injury  Outcome: Progressing Towards Goal  Goal: Decrease or eliminate episodes of syncope  Outcome: Progressing Towards Goal     Problem: Patient Education: Go to Patient Education Activity  Goal: Patient/Family Education  Outcome: Progressing Towards Goal     Problem: Falls - Risk of  Goal: *Absence of Falls  Description: Document Danny Ventura Fall Risk and appropriate interventions in the flowsheet. Outcome: Progressing Towards Goal  Note: Fall Risk Interventions:  Mobility Interventions: Utilize walker, cane, or other assistive device,PT Consult for mobility concerns,Patient to call before getting OOB         Medication Interventions: Teach patient to arise slowly,Patient to call before getting OOB    Elimination Interventions: Call light in reach,Patient to call for help with toileting needs,Toileting schedule/hourly rounds    History of Falls Interventions: Room close to nurse's station,Door open when patient unattended,Consult care management for discharge planning         Problem: Patient Education: Go to Patient Education Activity  Goal: Patient/Family Education  Outcome: Progressing Towards Goal     Problem: Pressure Injury - Risk of  Goal: *Prevention of pressure injury  Description: Document Tino Scale and appropriate interventions in the flowsheet. Outcome: Progressing Towards Goal  Note: Pressure Injury Interventions:  Sensory Interventions: Assess changes in LOC,Avoid rigorous massage over bony prominences,Check visual cues for pain,Discuss PT/OT consult with provider,Float heels,Keep linens dry and wrinkle-free,Maintain/enhance activity level,Minimize linen layers,Monitor skin under medical devices,Pad between skin to skin,Pressure redistribution bed/mattress (bed type),Turn and reposition approx.  every two hours (pillows and wedges if needed)         Activity Interventions: PT/OT evaluation,Increase time out of bed    Mobility Interventions: Float heels,HOB 30 degrees or less,Pressure redistribution bed/mattress (bed type),PT/OT evaluation,Turn and reposition approx.  every two hours(pillow and wedges)    Nutrition Interventions: Document food/fluid/supplement intake,Discuss nutritional consult with provider,Offer support with meals,snacks and hydration    Friction and Shear Interventions: Feet elevated on foot rest,Foam dressings/transparent film/skin sealants,HOB 30 degrees or less,Lift sheet,Lift team/patient mobility team,Minimize layers,Transferring/repositioning devices                Problem: Patient Education: Go to Patient Education Activity  Goal: Patient/Family Education  Outcome: Progressing Towards Goal     Problem: Patient Education: Go to Patient Education Activity  Goal: Patient/Family Education  Outcome: Progressing Towards Goal     Problem: Patient Education: Go to Patient Education Activity  Goal: Patient/Family Education  Outcome: Progressing Towards Goal     Problem: Pain  Goal: *Control of Pain  Outcome: Progressing Towards Goal  Goal: *PALLIATIVE CARE:  Alleviation of Pain  Outcome: Progressing Towards Goal     Problem: Patient Education: Go to Patient Education Activity  Goal: Patient/Family Education  Outcome: Progressing Towards Goal     Problem: Nutrition Deficit  Goal: *Optimize nutritional status  Outcome: Progressing Towards Goal     Problem: Patient Education: Go to Patient Education Activity  Goal: Patient/Family Education  Outcome: Progressing Towards Goal     Problem: Anorexia/Loss of Appetite-Palliative Care  Goal: *PALLIATIVE CARE-Adequate nutritional intake based on patient need and condition  Outcome: Progressing Towards Goal

## 2022-05-26 NOTE — HOSPICE
Hospice referral received. Chart review in process. Thank you for the referral to [facility]. If we can be of further assistance please contact [phone].     Thor HOPKINS, [facility] Inpatient Clinical Liaison  [facility] [address] [facility], [address] [place], [place] [address]  [phone]  Email: [email or web address]

## 2022-05-26 NOTE — PROGRESS NOTES
[facility] [place]: [phone]  [facility]: [phone]     Patient Name: Ana Holland  YOB: 1945    Date of Initial Consult : 5/25/2022  Date of follow-up:  5/26/2022  Reason for Consult: Care decisions  Requesting Provider: Dr. Jr Riley  Primary Care Physician: Jose Daniel Hernandez NP      SUMMARY:   Ana Holalnd is a 64 y.o. male with a past history of CKd stage 3, HTN, gout and chronic low back pain, who was admitted on 5/24/2022 from home with a diagnosis of syncope. Current medical issues leading to Palliative Medicine involvement include: elderly, cachectic 64year old gentleman admitted with recurrent syncopal episodes. Patient has loss of appetite and unexplained weight loss over the last couple of months. Palliative medicine is consulted for care decisons. CHIEF COMPLAINT: syncope    HPI/SUBJECTIVE:    Past history as above. Mr. Sara Monk is an elderly, cachectic 64year old gentleman admitted after having recurrent syncopal episodes. He reports \"blacking out\" when his brother-in-law was assisting him out of the bathtub at home. Patient reports losing his appetite about 2 months ago and unexplained weight loss of about 59 pounds in that same period. Denies chest pain, SOB or dizziness. 5/26/2022:  Lying in bed, awake, alert and oriented x4. Appears very tired. Sister and brother-in-law present at bedside. Denies pain or SOB. The patient is:   [x] Verbal and participatory  [] Non-participatory due to:     GOALS OF CARE:  DNR/DNI, comfort measures to start at discharge, no feeding tube  Patient/Health Care Proxy Stated Goals: Prolong life      TREATMENT PREFERENCES:   Code Status: DNR/DNI         PALLIATIVE DIAGNOSES:   1. Encounter for palliative care/goals of care  2. Syncope  3. Severe protein-calorie malnutrition  4. Debility       PLAN:   5/26/2022:  Seen at bedside along with Mr. Rosalino MSW.   Patient's sister, Isrrael Nguyen and her  in room visiting with patient. Ms. Livier Barker shared that patient had only come to live with her in the last 1 month. Mr. Isidro Auguste is lying in bed, awake, alert and oriented x4. Patient is soft-spoken but able to answer questions. Follow-up with patient regarding AMD and that after talking with his sister, learned that there is no MPOA paperwork. Patient agreeable to completing an AMD today naming his sister, Isrrael Nguyen, as his MPOA. AMD completed and signed by patient. Copy placed on chart for scanning. Re-visited goals of care in light of results from CT scan reported yesterday. Patient and family acknowledge understanding that patient has an extensive abdominal malignancy with multiple organ involvement. Patient and family are still processing this information. Reviewed goals of care including benefits and burdens of resuscitation, intubation and ventilation in light of patient's frailty, advanced age, malnutrition and advanced disease. Patient and family are in agreement with DNR/DNI. Discussed option of comfort measures at discharge with the support of hospice at home. Patient and family are in agreement with this plan. Sister and brother-in-law are agreeable to taking patient home with them at discharge with the support of hospice. Hospice consult placed. Introduced and explained POST form and patient agreeable to completing today for DNR/DNI, comfort measures to start at discharge and no feeding tube. POST form signed by patient in the presence of palliative team, his sister, Isrrael Nguyen and brother-in-law. Original and copy of POST form provided to patient's sister to take home with instructions on where to place this document in the home. Goals of care are DNR/DNI, comfort measures (to start at discharge), no feeding tube. Please see below for previous conversations with the palliative medicine team:    1.  Encounter for palliative care/goals of care - seen at bedside along with Mr. Jerry, MSW. Mr. Sanjay Lopez is lying in bed, awake, alert and oriented x4. Appears weak and cachectic. Able to share that he came to the hospital after \"blacking out\" at home when his brother-in-law was assisting him out of the bathtub. Patient reports that about 2 months ago his \"appetite just went away\" and that he has lost 59 pounds in the last 2 months. Denies pain or shortness of breath. Reports that he is retired from [employer] as a  for 30 years. Patient is not  and has no children. Patient has 4 living siblings, 2 brothers and 2 sisters. He lives with his youngest sister, Bolivar Perla and her . 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 With patient's permission, attempted to call Bolivar Perla to request copy of MPOA paperwork. Left voicemail requesting a return call. Introduced and discussed goals of care including benefits and burdens of resuscitation, intubation and ventilation. Patient stated \"Alecia knows what to do\". When asked if he would want to undergo CPR including chest compressions, shock, ACLS medications, intubation and mechanical ventilation, patient stated that he would be accepting of these efforts. Patient reports having had multiple tests earlier today and he is waiting to get results of these tests. At this time, patient remains a full code with full interventions. Awaiting call back from patient's stated Hai Griffiths, regarding MPOA paperwork. 2. Syncope - primary team managing, cardiology consulted and following, echo pending, cardiac monitoring, orthostatic blood pressure check  3. Severe protein-calorie malnutrition - BMI 18.40, nutrition consult, nutritional supplements  4.  Debility - PPS 48, lives with his sister and her , reportedly independent with ADLs at home and uses a cane for ambulation assistance. Patient did report needing a walker and per chart review, patient was being assisted by his brother-in-law for some personal care when he \"blacked out\". Patient reports 59 pound weight loss in last 2 months, states he lost his appetite 2 months ago, consumes ~3 nutritional supplements per day at home,  PT/OT consult  5. Initial consult note routed to primary continuity provider  6. Communicated plan of care with: Palliative IDT      Advance Care Planning:  [] [facility] Interdisciplinary Team has updated the ACP Navigator with [address] and Patient Capacity    Primary Decision [facility] ([address]): Kaylin Montgomery (sister)  [phone]        Artificially Administered Nutrition: No feeding tube     As far as possible, the palliative care team has discussed with patient / health care proxy about goals of care / treatment preferences for patient. HISTORY:     History obtained from: Chart and patient    Principal Problem:    Recurrent syncope (5/24/2022)    Active Problems:    Weight loss (5/25/2022)      Debility (5/25/2022)      Hypertension (5/25/2022)      Stage 3b chronic kidney disease ([address] (5/25/2022)      Severe protein-calorie malnutrition Ford Claudio: less than 60% of standard weight) ([address] (5/25/2022)      Encounter for palliative care ()      Carcinoma of pancreas metastatic to liver ([address] (5/26/2022)      Carcinomatosis ([address] (6/87/6378)      Systolic CHF, chronic ([address] (5/26/2022)      Past Medical History:   Diagnosis Date    Chronic kidney disease     stage 3 in 2011    CKD (chronic kidney disease)     ED (erectile dysfunction)     Gout     HTN (hypertension)     Mixed hyperlipidemia       Past Surgical History:   Procedure Laterality Date    HX COLONOSCOPY      HX ENDOSCOPY        No family history on file. History reviewed, no pertinent family history.   Social History     Tobacco Use    Smoking status: Former Smoker    Smokeless tobacco: Never Used   Substance Use Topics    Alcohol use: No     Alcohol/week: 0.0 standard drinks     No Known Allergies   Current Facility-Administered Medications   Medication Dose Route Frequency    tamsulosin (FLOMAX) capsule 0.4 mg  0.4 mg Oral ACD    dextrose 5 % - 0.45% NaCl infusion  25 mL/hr IntraVENous CONTINUOUS    famotidine (PF) (PEPCID) 20 mg in 0.9% sodium chloride 10 mL injection  20 mg IntraVENous Q12H    ondansetron (ZOFRAN) injection 4 mg  4 mg IntraVENous Q6H PRN    sodium chloride (NS) flush 5-40 mL  5-40 mL IntraVENous Q8H    sodium chloride (NS) flush 5-40 mL  5-40 mL IntraVENous PRN    acetaminophen (TYLENOL) tablet 650 mg  650 mg Oral Q4H PRN    oxyCODONE IR (ROXICODONE) tablet 5 mg  5 mg Oral Q4H PRN    naloxone (NARCAN) injection 0.4 mg  0.4 mg IntraVENous PRN    bisacodyL (DULCOLAX) tablet 10 mg  10 mg Oral DAILY PRN    heparin (porcine) injection 5,000 Units  5,000 Units SubCUTAneous Q8H          Clinical Pain Assessment (nonverbal scale for nonverbal patients): Clinical Pain Assessment  Severity: 0          Duration: for how long has pt been experiencing pain (e.g., 2 days, 1 month, years)  Frequency: how often pain is an issue (e.g., several times per day, once every few days, constant)     FUNCTIONAL ASSESSMENT:     Palliative Performance Scale (PPS):  PPS: 40    ECOG  ECOG Status : Ambulatory, but unable to carry out work activities     PSYCHOSOCIAL/SPIRITUAL SCREENING:      Any spiritual / Tenriism concerns:  [] Yes /  [x] No    Caregiver Burnout:  [] Yes /  [] No /  [x] No Caregiver Present      Anticipatory grief assessment:   [x] Normal  / [] Maladaptive        REVIEW OF SYSTEMS:     Systems: constitutional, ears/nose/mouth/throat, respiratory, gastrointestinal, genitourinary, musculoskeletal, integumentary, neurologic, psychiatric, endocrine. Positive findings noted below.   Modified ESAS Completed by: provider   Fatigue: 2 Drowsiness: 0   Depression: 3 Pain: 0   Anxiety: 0 Nausea: 0   Anorexia: 5 Dyspnea: 0           Stool Occurrence(s): 0   Positive and pertinent negative findings in ROS are noted above in HPI. The following systems were [x] reviewed / [] unable to be reviewed as noted in HPI  Other findings are noted below. PHYSICAL EXAM:     Constitutional: lying in bed, weak appearing, cachectic, awake, alert and oriented x4, poor appetite  Eyes: pupils equal, anicteric  ENMT: no nasal discharge, moist mucous membranes  Cardiovascular: regular rhythm, distal pulses intact  Respiratory: breathing not labored, symmetric  Gastrointestinal: soft   Last bowel movement: none recorded  Musculoskeletal: no deformity, no tenderness to palpation  Skin: warm, dry  Neurologic: following commands, moving all extremities  Psychiatric: depressed affect, no hallucinations    Other: Wt Readings from Last 3 Encounters:   05/25/22 54.9 kg (121 lb)   05/19/22 51.7 kg (114 lb)   10/20/20 71.9 kg (158 lb 9.6 oz)     Blood pressure 132/79, pulse 95, temperature 98.1 °F (36.7 °C), resp. rate 18, height 5' 8\" (1.727 m), weight 54.9 kg (121 lb), SpO2 100 %. Pain:  Pain Scale 1: Numeric (0 - 10)  Pain Intensity 1: 0     Pain Location 1: Back  Pain Orientation 1: Lower  Pain Description 1: Aching,Sharp  Pain Intervention(s) 1: Medication (see MAR)       LAB AND IMAGING FINDINGS:     Lab Results   Component Value Date/Time    WBC 12.1 05/24/2022 10:55 AM    HGB 10.2 (L) 05/24/2022 10:55 AM    PLATELET 899 69/75/8668 10:55 AM     Lab Results   Component Value Date/Time    Sodium 138 05/26/2022 03:25 AM    Potassium 4.3 05/26/2022 03:25 AM    Chloride 109 05/26/2022 03:25 AM    CO2 21 05/26/2022 03:25 AM    BUN 55 (H) 05/26/2022 03:25 AM    Creatinine 2.27 (H) 05/26/2022 03:25 AM    Calcium 8.8 05/26/2022 03:25 AM    Magnesium 2.8 (H) 05/25/2022 03:50 AM      Lab Results   Component Value Date/Time    Alk.  phosphatase 570 (H) 05/24/2022 10:55 AM    Protein, total 7.3 05/24/2022 10:55 AM    Albumin 2.8 (L) 05/24/2022 10:55 AM Globulin 4.5 (H) 05/24/2022 10:55 AM     No results found for: INR, PTMR, PTP, PT1, PT2, APTT, INREXT, INREXT   Lab Results   Component Value Date/Time    Iron 35 (L) 05/25/2022 03:50 AM    TIBC 141 (L) 05/25/2022 03:50 AM    Iron % saturation 25 05/25/2022 03:50 AM      No results found for: PH, PCO2, PO2  No components found for: GLPOC   No results found for: CPK, CKMB           Total time: 65 minutes    > 50% counseling / coordination:  Time spent in direct consultation with the patient, medical team, and family     Prolonged service was provided for  []30 min   []75 min in face to face time in the presence of the patient, spent as noted above. Time Start:   Time End:     Disclaimer: Sections of this note are dictated using utilizing voice recognition software, which may have resulted in some phonetic based errors in grammar and contents. Even though attempts were made to correct all the mistakes, some may have been missed, and remained in the body of the document. If questions arise, please contact our department.

## 2022-05-26 NOTE — PROGRESS NOTES
8456:  Uneventful shift. Patient able to turn and shift positions himself with encouragement and some assistance from staff. Patient able to sit up to edge of bed to use urinal.  No events overnight.

## 2022-05-27 NOTE — PROGRESS NOTES
Problem: Syncope  Goal: *Absence of injury  Outcome: Progressing Towards Goal  Goal: Decrease or eliminate episodes of syncope  Outcome: Progressing Towards Goal     Problem: Patient Education: Go to Patient Education Activity  Goal: Patient/Family Education  Outcome: Progressing Towards Goal     Problem: Falls - Risk of  Goal: *Absence of Falls  Description: Document Austin Fall Risk and appropriate interventions in the flowsheet. Outcome: Progressing Towards Goal  Note: Fall Risk Interventions:  Mobility Interventions: Assess mobility with egress test,Bed/chair exit alarm,Communicate number of staff needed for ambulation/transfer,OT consult for ADLs,Patient to call before getting OOB,PT Consult for mobility concerns,PT Consult for assist device competence,Strengthening exercises (ROM-active/passive),Utilize walker, cane, or other assistive device         Medication Interventions: Bed/chair exit alarm,Evaluate medications/consider consulting pharmacy,Patient to call before getting OOB,Teach patient to arise slowly    Elimination Interventions: Bed/chair exit alarm,Call light in reach,Patient to call for help with toileting needs,Stay With Me (per policy),Toilet paper/wipes in reach,Toileting schedule/hourly rounds,Urinal in reach    History of Falls Interventions: Bed/chair exit alarm,Consult care management for discharge planning,Door open when patient unattended,Evaluate medications/consider consulting pharmacy,Investigate reason for fall,Room close to nurse's station         Problem: Patient Education: Go to Patient Education Activity  Goal: Patient/Family Education  Outcome: Progressing Towards Goal     Problem: Pressure Injury - Risk of  Goal: *Prevention of pressure injury  Description: Document Tino Scale and appropriate interventions in the flowsheet.   Outcome: Progressing Towards Goal  Note: Pressure Injury Interventions:  Sensory Interventions: Assess changes in LOC,Avoid rigorous massage over bony prominences,Check visual cues for pain,Discuss PT/OT consult with provider,Float heels,Keep linens dry and wrinkle-free,Maintain/enhance activity level,Minimize linen layers,Monitor skin under medical devices,Pad between skin to skin,Pressure redistribution bed/mattress (bed type),Turn and reposition approx. every two hours (pillows and wedges if needed)         Activity Interventions: Increase time out of bed,Pressure redistribution bed/mattress(bed type),PT/OT evaluation    Mobility Interventions: Float heels,HOB 30 degrees or less,Pressure redistribution bed/mattress (bed type),PT/OT evaluation,Turn and reposition approx.  every two hours(pillow and wedges)    Nutrition Interventions: Document food/fluid/supplement intake,Offer support with meals,snacks and hydration    Friction and Shear Interventions: Apply protective barrier, creams and emollients,Foam dressings/transparent film/skin sealants,HOB 30 degrees or less,Lift sheet,Lift team/patient mobility team,Minimize layers,Transferring/repositioning devices                Problem: Patient Education: Go to Patient Education Activity  Goal: Patient/Family Education  Outcome: Progressing Towards Goal

## 2022-05-27 NOTE — PROGRESS NOTES
Physician Progress Note      Nathalie Elizabeth  CSN #:                  [id number]  :                       1945  ADMIT DATE:       2022 10:39 AM  DISCH DATE:  RESPONDING  PROVIDER #:        Loly Olivas MD          QUERY TEXT:    Pt admitted with syncope and is noted on  \"Syncope is most likely due to intravascular depletion and dehydration due to poor oral intake  . \" Pt noted to have newly diagnosed metastatic pancreatic cancer. If possible, please document in progress notes and discharge summary the relationship, if any, between syncope and dehydration or syncope and pancreatic cancer. Syncope due to dehydration::This patient has syncope due to dehydraytion.]]    The medical record reflects the following:    Risk Factors: Severe malnutrition, Pancreatic Cancer with mets, Anorexia, Debility, Weight loss, previous smoker    Clinical Indicators:  > Per Cardiology PN - \"Syncope is most likely due to intravascular depletion and dehydration due to poor oral intake\"  > Per Oncology consult- Pancreatic cancer with extensive metastasis  >  CT showed large pancreatic tail mass and extensive abdominal malignancy, peritoneal carcinomatosis, small volume ascites, extensive liver metastatic disease. Treatment: Receiving Cardiology consult, Oncology consult, IV fluids    Thank you,  Tsering Sierra RN, BSN, CRCR  [email or web address]  Options provided:  -- Syncope due to pancreatic cancer with extensive metastasis  -- Other - I will add my own diagnosis  -- Disagree - Not applicable / Not valid  -- Disagree - Clinically unable to determine / Unknown  -- Refer to Clinical Documentation Reviewer    PROVIDER RESPONSE TEXT:    This patient has syncope due to pancreatic cancer with extensive metastasis.     Query created by: Adolph Ta on 2022 9:39 AM      Electronically signed by:  Loly Olivas MD 2022 1:41 PM

## 2022-05-27 NOTE — PROGRESS NOTES
Palliative Medicine    A POST form was completed with patient and his family was present on 5/26/2022. The original and a copy were given to the sister. The copy of the POST was placed on the chart this AM for planned discharge. At this time, patient is a DNR/DNI with a transition to comfort measures upon discharge and NO feeding tubes.     Ludy RASHIDN, RN, [facility]  Palliative Medicine Inpatient RN  Palliative COPE Line: [phone]

## 2022-05-27 NOTE — PROGRESS NOTES
Problem: Syncope  Goal: *Absence of injury  Outcome: Progressing Towards Goal  Goal: Decrease or eliminate episodes of syncope  Outcome: Progressing Towards Goal     Problem: Patient Education: Go to Patient Education Activity  Goal: Patient/Family Education  Outcome: Progressing Towards Goal     Problem: Falls - Risk of  Goal: *Absence of Falls  Description: Document Yonathan Ellis Fall Risk and appropriate interventions in the flowsheet. Outcome: Progressing Towards Goal  Note: Fall Risk Interventions:  Mobility Interventions: OT consult for ADLs,Patient to call before getting OOB,PT Consult for mobility concerns,PT Consult for assist device competence         Medication Interventions: Patient to call before getting OOB,Teach patient to arise slowly    Elimination Interventions: Call light in reach,Patient to call for help with toileting needs,Toileting schedule/hourly rounds    History of Falls Interventions: Bed/chair exit alarm,Door open when patient unattended         Problem: Patient Education: Go to Patient Education Activity  Goal: Patient/Family Education  Outcome: Progressing Towards Goal     Problem: Patient Education: Go to Patient Education Activity  Goal: Patient/Family Education  Outcome: Progressing Towards Goal     Problem: Pressure Injury - Risk of  Goal: *Prevention of pressure injury  Description: Document Tino Scale and appropriate interventions in the flowsheet. Outcome: Progressing Towards Goal  Note: Pressure Injury Interventions:  Sensory Interventions: Assess changes in LOC,Avoid rigorous massage over bony prominences,Check visual cues for pain,Discuss PT/OT consult with provider,Float heels,Keep linens dry and wrinkle-free,Maintain/enhance activity level,Minimize linen layers,Monitor skin under medical devices,Pad between skin to skin,Pressure redistribution bed/mattress (bed type),Turn and reposition approx.  every two hours (pillows and wedges if needed)         Activity Interventions: Increase time out of bed,PT/OT evaluation    Mobility Interventions: Pressure redistribution bed/mattress (bed type),Float heels    Nutrition Interventions: Discuss nutritional consult with provider,Offer support with meals,snacks and hydration,Document food/fluid/supplement intake    Friction and Shear Interventions: Apply protective barrier, creams and emollients,Foam dressings/transparent film/skin sealants                Problem: Patient Education: Go to Patient Education Activity  Goal: Patient/Family Education  Outcome: Progressing Towards Goal     Problem: Patient Education: Go to Patient Education Activity  Goal: Patient/Family Education  Outcome: Progressing Towards Goal     Problem: Patient Education: Go to Patient Education Activity  Goal: Patient/Family Education  Outcome: Progressing Towards Goal     Problem: Pain  Goal: *Control of Pain  Outcome: Progressing Towards Goal  Goal: *PALLIATIVE CARE:  Alleviation of Pain  Outcome: Progressing Towards Goal     Problem: Patient Education: Go to Patient Education Activity  Goal: Patient/Family Education  Outcome: Progressing Towards Goal     Problem: Nutrition Deficit  Goal: *Optimize nutritional status  Outcome: Progressing Towards Goal     Problem: Patient Education: Go to Patient Education Activity  Goal: Patient/Family Education  Outcome: Progressing Towards Goal     Problem: Anorexia/Loss of Appetite-Palliative Care  Goal: *PALLIATIVE CARE-Adequate nutritional intake based on patient need and condition  Outcome: Progressing Towards Goal

## 2022-05-27 NOTE — HOSPICE
1124: Reached out to Dr Paula García via [facility] for medications below for pt.     1111:  Please send scripts for comfort medications to the outpatient pharmacy to be filled and sent home with the pt at discharge. I have verified with the pharmacy the medication is available. 1. Morphine concentrate 20mg/ml  Give 0.25-1ml po/sl every 3 hours PRN for pain/air hunger  Quantity 30ml     2. Lorazepam oral solution 2mg/ml   Give 0.25-1ml po/sl every 6 hours PRN for anxiety/agitation   Quantity 30 ml     3. Acetaminophen Suppository 650mg  Give one suppository rectally every 4 hours PRN for fever/pain  Quantity 4 suppositories    4. Hyoscyamine 0.125mg tablets  Give 1 tab po/sl every 6 hours PRN for terminal congestion  Quantity 10 tabs. 5. Bisacodyl Suppository 10mg  Give one suppository rectally every day PRN for constipation  Quantity 5 suppositories    1037:  Spoke with pt's sister, Hayde Beckham via phone. Discussed [facility] philosophy, services, criteria, and IDT. Discussed caregiver need for round the clock care with the sister, Rubio Cunningham. Primary caregiver identified as Rubio Cunningham, the sister. Caregiver concerns identified as none at this time. Answered all questions. Gave 24/7 contact information. Information to be sent to [facility] for approval of hospice services. Thank you for the referral to [facility]. If we can be of further assistance please contact [phone].     Lili RASHIDN, [facility] Inpatient Clinical Liaison  [facility] [place]  [facility] [address] [facility], [facility] [address]  [phone]  Email: [email or web address]

## 2022-05-27 NOTE — DISCHARGE SUMMARY
[address] [facility] SUMMARY    Name:  Srini Barnett  MR#:   [id number]  :  1945  ACCOUNT #:  [de-identified]  ADMIT DATE:  2022  DISCHARGE DATE:  2022      DISCHARGE DIAGNOSES:  1. Metastatic pancreatic lesion to the liver with associated carcinomatosis and ascites. 2.  Chronic kidney disease, stage III. 3.  Severe protein-calorie malnutrition with cachexia. 4.  History of urethral stricture. 5.  Recent syncope, recurrent. 6.  Weight loss. 7.  Volume depletion and severe malnutrition. CONSULTANTS:  1.  Dr. Amanda Stringer, Cardiology. 2.  Dr. Dana Landis, Oncology. 3.  Dr. Kavita Loya, General Surgery. HOSPITAL SUMMARY:  The patient is 64 years' old. He has been progressively losing weight over the last few months with recurrent episode of syncope to the point where he again had an episode that brought him to the emergency room here. His family who is his sister and brother-in-law whom he stays with have noted that he has been complaining of very poor appetite and noted weight loss recently and has been diagnosed with UTI 5 days prior. He was given some antibiotics. He had not been admitted to the hospital recently, but his weight loss continued, very poor intake at home and subsequently when he came in for the syncopal event, we recognized fairly severe cachexia, malnutrition, worsening chronic kidney disease, iron deficiency and sent him for CT scan of abdomen and pelvis which unfortunately revealed metastatic cancer with a large pancreatic tail mass, peritoneal carcinomatosis, extensive liver metastatic disease. Consult with Surgery, Oncology, all of whom at this point understand and have discussed with the family that this is a palliative situation. They do not want a feeding tube for him, nor does the patient. Recommendation is for hospice care from Oncology, and Palliative Care has been working with the family to establish goals of care with a post form.   He has a do not resuscitate, do not intubate and transition to home will be made with hospice at this time. The patient today is awake, complaining of some left hip pain. He is very thin and cachectic, very weakened [place] American gentleman, but alert and oriented x3. Temperature is 97.9, pulse 94, blood pressure 134/82, respiratory rate 18, SaO2 is 100%. There has been no nausea, vomiting or shortness of breath overnight. Abdomen is not distended. There is diminished bowel sounds. No tenderness. Lungs, diminished at the bases, otherwise clear. Cardiac exam, regular rate and rhythm. Lower extremities, no pitting edema. Most recent creatinine is 2.27, H and H are 10.2 and 32.7. He is stable for release to hospice care at home once arrangements are made with Case Management. I have called in the Roxanol solution to be used every 2 hours as needed for pain and per hospice protocol as well as the Ativan solution. Otherwise, recommendation will be comfort measures through hospice at home, discontinuation of his regular home medications. Prognosis is in the weeks at this point in time. He has had very limited oral intake due to nausea and vomiting that occurs thereafter. The patient's prognosis is very poor. Arrangements should be made for hospice care at home today. The family requests this and I would defer to Case Management for finalization of those plans. Forty minutes on discharge time today.       Salvador Whitney MD      [id number]  D:  05/27/2022 10:40  T:  05/27/2022 11:08  JOB #:  [id number]  CC:  Jyotsna Gonzalez NP

## 2022-05-27 NOTE — PROGRESS NOTES
D/C Plan: Home with Hospice on Saturday with 1700 transport     CM spoke with palliative, palliative notified CM that there is POST on the chart for comfort measures upon discharge. [facility]. CM called the hospice RN as patient has discharge order for today and equipment is set to arrive tomorrow. Per Hospice RN, family is adamant they can not accept the patient home today and can not get the equipment delivered until tomorrow morning between 8-12 PM. CM updated the physician. Hospice RN also mentioned she is having difficulty getting the patient's morphine from the pharmacy and has been working on that most of the day. Per Hospice, the patient's meds will be taken to the inpatient pharmacy when out[patient pharmacy closes. The meds are morphine, acetaminophen suppositories and dulcolax suppositories, and lorazepam which will be in the refrgerator, 4 meds in total. Nursing please  meds from inpatient pharmacy at discharge. Transport set for 1700 pickup on Saturday 5/28/2022.

## 2022-05-27 NOTE — HOSPICE
Pt/family pursuing hospice:yes    Admission dx approved by Hospice Medical Director: Metastatic Pancreatic CA    Anticipated hospital d/c date:Saturday 5/28/22     Discussion occurred with patient and/or family about preference of hospitalization once admitted to hospice: yes    Reviewed and discussed hospice philosophy and keeping the patient comfortable in their residence: yes     Discussion with patient/family regarding around the clock caregiver in place due to patient safety in the home once discharged: yes     Narrative of events and/or assessment if applicable: Suzanne to deliver tomorrow, 5/28/22, between [id number]. Confirmation # [id number]. CM setting up transportation; will update. Transportation time is 1700 on Saturday 5/28/22.     Amrik HOPKINS, [facility] Inpatient Clinical Liaison  [facility] [address], [address] [place] [address].  [phone]  Email: [email or web address]

## 2022-05-27 NOTE — PROGRESS NOTES
Bedside and Verbal shift change report given to JASON Riddle (oncoming nurse) by MIKAEL Jennings, RN (offgoing nurse). Report included the following information SBAR, Kardex, Intake/Output, MAR and Recent Results.

## 2022-05-28 NOTE — PROGRESS NOTES
[facility] called to give an updated  time of 1930 instead of 1700. Sister, Telma Garcia, notified of time change and asked what address was on file for transport. RN stated [address] [facility]. Ms. Emily Gonzalez stated it would be [address] [facility]. Zac Perales CM notified and to follow-up with Ms. Emily Gonzalez. 1830- All medications listed in CM note (morphine, ativan, tylenol and dulcolax suppositories) given to transport. Red security/tamper seal in place.

## 2022-05-28 NOTE — DISCHARGE SUMMARY
[address] [facility] SUMMARY    Name:  Marcus Roche  MR#:   [id number]  :  1945  ACCOUNT #:  [de-identified]  ADMIT DATE:  2022  DISCHARGE DATE:  2022      DISCHARGE DIAGNOSES:  1. Metastatic pancreatic lesion to the liver with associated carcinomatosis and ascites. 2.  Chronic kidney disease, stage III. 3.  Severe protein-calorie malnutrition with cachexia. 4.  History of urethral stricture. 5.  Recent syncope, recurrent. 6.  Weight loss. 7.  Volume depletion and severe malnutrition. CONSULTANTS:  1.  Dr. Tobias Qureshi, Cardiology. 2.  Dr. Anupam Abernathy, Oncology. 3.  Dr. Liseth Kendrick, General Surgery. HOSPITAL SUMMARY:  The patient is 64 years' old. He has been progressively losing weight over the last few months with recurrent episode of syncope to the point where he again had an episode that brought him to the emergency room here. His family who is his sister and brother-in-law whom he stays with have noted that he has been complaining of very poor appetite and noted weight loss recently and has been diagnosed with UTI 5 days prior. He was given some antibiotics. He had not been admitted to the hospital recently, but his weight loss continued, very poor intake at home and subsequently when he came in for the syncopal event, we recognized fairly severe cachexia, malnutrition, worsening chronic kidney disease, iron deficiency and sent him for CT scan of abdomen and pelvis which unfortunately revealed metastatic cancer with a large pancreatic tail mass, peritoneal carcinomatosis, extensive liver metastatic disease. Consult with Surgery, Oncology, all of whom at this point understand and have discussed with the family that this is a palliative situation. They do not want a feeding tube for him, nor does the patient. Recommendation is for hospice care from Oncology, and Palliative Care has been working with the family to establish goals of care with a post form.   He has a do not resuscitate, do not intubate and transition to home will be made with hospice at this time. The patient today is awake, complaining of some left hip pain. He is very thin and cachectic, very weakened [place] American gentleman, but alert and oriented x3. Temperature is 97.9, pulse 94, blood pressure 134/82, respiratory rate 18, SaO2 is 100%. There has been no nausea, vomiting or shortness of breath overnight. Abdomen is not distended. There is diminished bowel sounds. No tenderness. Lungs, diminished at the bases, otherwise clear. Cardiac exam, regular rate and rhythm. Lower extremities, no pitting edema. Most recent creatinine is 2.27, H and H are 10.2 and 32.7. He is stable for release to hospice care at home once arrangements are made with Case Management. I have called in the Roxanol solution to be used every 2 hours as needed for pain and per hospice protocol as well as the Ativan solution. Otherwise, recommendation will be comfort measures through hospice at home, discontinuation of his regular home medications. Prognosis is in the weeks at this point in time. He has had very limited oral intake due to nausea and vomiting that occurs thereafter. The patient's prognosis is very poor. Arrangements should be made for hospice care at home today. The family requests this and I would defer to Case Management for finalization of those plans. Forty minutes on discharge time today.       Lukas Correa MD      [id number]  D:  05/27/2022 10:40  T:  05/27/2022 11:08  JOB #:  [id number]  CC:  Barbra Tatum NP    5/28/22:   S: pt has no new issues, trying to eat lunch  O: General: fril thin elderly AAM NAD Ox3  CVS:Regular rate and rhythm, no M/R/G, S1/S2 heard, no thrill  Lungs:Clear to auscultation bilaterally, no wheezes, rhonchi, or rales  Abdomen: Soft, Nontender, ND dec BS  Extremities: No C/C/E, pulses palpable 2+  Neuro:grossly normal , follows commands  Psych:appropriate  A/P DC home on hospice   DC summary done above by Dr. Josefina Barraza

## 2022-05-28 NOTE — PROGRESS NOTES
D/C Plan: home with Hospice    Care manager contacted [facility], verified patient will be going to same address as is on face sheet - [place] [address] [place] [address]; per Hospice nurse, hospital bed is en route for delivery at this time; primary care nurse aware of need to  4 meds in inpatient pharmacy to send home with patient and hospitalist made aware that patient will be picket up at [phone]: Care manager made aware of new pickup time of 1930; called sister and verified new address is actually [address], [place], [place]; called Aliza Cash and made verbal correction of address: new medical necessity form should be completed with corrected address or it can be white -  outed with new address, just cannot be scratched out to correct and this request was passed on to nursing staff. Sister is also aware that patient's four meds will be transported home with him. Care Management Interventions  PCP Verified by CM:  Yes  Mode of Transport at Discharge: Self  Transition of Care Consult (CM Consult): Naomi Johnson 59: Yes  Discharge Durable Medical Equipment: Yes (Wants Rolling Walker)  Physical Therapy Consult: Yes  Support Systems: Other Family Member(s) (sister)  Confirm Follow Up Transport: Other (see comment)  The Plan for Transition of Care is Related to the Following Treatment Goals : metastatic pancreatic lesion (medical transport)  The Patient and/or Patient Representative was Provided with a Choice of Provider and Agrees with the Discharge Plan?: Yes  Name of the Patient Representative Who was Provided with a Choice of Provider and Agrees with the Discharge Plan: Rose Hendrickson, patient  Discharge Location  Patient Expects to be Discharged to[de-identified]  (hospice)

## 2022-05-29 NOTE — PROGRESS NOTES
2004     Pt family called with a question regarding pain medication - entered chart to see last pain medication given

## 2022-05-29 NOTE — HOSPICE
Hospice Admission Summary  Mr. Flora Hunt, 64year old male, admitted to Hospice services for a terminal diagnosis of Metastatic Pancreatic Cancer. Patient has elected hospice services and is no longer seeking aggressive treatment. Co-morbidities related to the terminal diagnosis are CKD, anemia, debility. Patient also has a past medical history of MI, HTN.  has been experiencing increased weight loss over the last few months. Family took him to the ED for eval and was found to have an UTI. Pt was treated for the UTI but continued to lose weight. Most recent ED visit was from a syncopal episode without injury. CT scan showed increased mets to pancreas, peritoneal space and liver. The patient/family is present and willing and safely able to provide care and administer medications, their availability is daily. The patient/family participated in goal setting, care planning, and are agreeable to the care plan. Admission booklet reviewed with patient/family; services provided under hospice benefit, review of rights and responsibilities, disposal of medications, contact information for , Joint Commission, Medicare, O, and outside resources for independence. The patient/family educated on IDT and their right to attend meetings. Education provided regarding 24-hour availability of hospice services and on-call number provided.     Attending physician:  Dr. Constanza Shepherd Director:  Dr. Lea Ramirezegacarrie  Level of Care:  Routine  Advance Directives:  DNR  Allergies:  NKDA  MAC:  21cm R arm  Height:  5'8\"  Weight:  121lbs  PPS:  40%  EF%:  45-50%  Tobacco usage:  N/A  Functional status:  5/6  Infection:  N/A   Pain:  mild  medications include Morphine Sulfate  Bowel Regimen:  Bisacodyl PRN  Lines, Drains, or Airways present:   none See Care Plan for Intervention Orders  Wounds present:  none  See wound care plan for intervention orders  Symptoms to monitor to maintain comfort:  pain, luh, N/V  Hospice Aide Services Requested:  x2/wk  MSW Requested: yes   Requested: no  Volunteer Services Requested:  no  Bereavement risk/contact:  low  Patient/family/caregiver specific end of life goal:  pain control with lucidity  Training and education provided this visit: hospice orientation  Plan for next visit:  24 hour follow-up  Care coordination with Medical Director, IDG, and family regarding admission to hospice and all are in agreement with plan of care.

## 2022-05-30 NOTE — HOSPICE
24 hour follow-up. Pt recieved in bed, family conducting brief change. Pt experianced some confusion, thinking he \"went upstairs to use the bathroom. \" The residence has no second story and the pt has been unable to walk. no further complaints at time of visit. 24 hour hospice number reinforced.

## 2022-05-31 NOTE — HOSPICE
Patient non responsive only with movement,  eyes fixed, groaning with movement of left arm unable to get POX   sister present during visit stating npo since night prior only sips of ensure taken  patient  during visit 1137am during visit  hospice staff made aware of status

## 2022-05-31 NOTE — HOSPICE
patient  1137am, no pulse or no heart rate checked via brachial and heart or rise or fall of chest  hospice don and staff made awareof death  family low risk   FH Rashad Amador [phone] called spoke to  Vanessa Tyson, to pickup body in 2hrs and call family for eta per family request for other family to wants to visit

## (undated) DEVICE — TRAY PREP DRY W/ PREM GLV 2 APPL 6 SPNG 2 UNDPD 1 OVERWRAP

## (undated) DEVICE — STERILE POLYISOPRENE POWDER-FREE SURGICAL GLOVES: Brand: PROTEXIS

## (undated) DEVICE — HIGH PRESSURE NEPHROSTOMY BALLOON CATHETER: Brand: NEPHROMAX

## (undated) DEVICE — CATHETER URETH 22FR BLLN 5CC STD LTX 2 W TWO OPP DRNGE EYE

## (undated) DEVICE — CYSTO PACK: Brand: MEDLINE INDUSTRIES, INC.

## (undated) DEVICE — TOWEL,OR,DSP,ST,BLUE,STD,4/PK,20PK/CS: Brand: MEDLINE

## (undated) DEVICE — STRAP,POSITIONING,KNEE/BODY,FOAM,4X60": Brand: MEDLINE

## (undated) DEVICE — SOL IRR STRL H2O 1000ML BTL --

## (undated) DEVICE — SOLUTION IRRIG 3000ML 0.9% SOD CHL FLX CONT 0797208] ICU MEDICAL INC]

## (undated) DEVICE — GARMENT,MEDLINE,DVT,INT,CALF,MED, GEN2: Brand: MEDLINE

## (undated) DEVICE — INFLATION DEVICE: Brand: ENCORE 26